# Patient Record
Sex: MALE | Race: WHITE | NOT HISPANIC OR LATINO | Employment: FULL TIME | URBAN - METROPOLITAN AREA
[De-identification: names, ages, dates, MRNs, and addresses within clinical notes are randomized per-mention and may not be internally consistent; named-entity substitution may affect disease eponyms.]

---

## 2018-06-05 ENCOUNTER — OFFICE VISIT (OUTPATIENT)
Dept: FAMILY MEDICINE | Facility: CLINIC | Age: 43
End: 2018-06-05
Payer: COMMERCIAL

## 2018-06-05 VITALS
HEIGHT: 73 IN | OXYGEN SATURATION: 96 % | HEART RATE: 63 BPM | SYSTOLIC BLOOD PRESSURE: 120 MMHG | BODY MASS INDEX: 27.23 KG/M2 | TEMPERATURE: 97.3 F | DIASTOLIC BLOOD PRESSURE: 82 MMHG | WEIGHT: 205.5 LBS

## 2018-06-05 DIAGNOSIS — K64.4 EXTERNAL HEMORRHOIDS: ICD-10-CM

## 2018-06-05 DIAGNOSIS — Z91.018 NUT ALLERGY: ICD-10-CM

## 2018-06-05 DIAGNOSIS — Z83.3 FAMILY HISTORY OF DIABETES MELLITUS: ICD-10-CM

## 2018-06-05 DIAGNOSIS — M54.2 CHRONIC NECK PAIN: ICD-10-CM

## 2018-06-05 DIAGNOSIS — E78.5 HYPERLIPIDEMIA, UNSPECIFIED HYPERLIPIDEMIA TYPE: ICD-10-CM

## 2018-06-05 DIAGNOSIS — H61.91 LESION OF RIGHT EXTERNAL EAR: ICD-10-CM

## 2018-06-05 DIAGNOSIS — G89.29 CHRONIC NECK PAIN: ICD-10-CM

## 2018-06-05 DIAGNOSIS — Z00.00 ROUTINE HISTORY AND PHYSICAL EXAMINATION OF ADULT: Primary | ICD-10-CM

## 2018-06-05 LAB — HBA1C MFR BLD: 5.4 % (ref 0–5.6)

## 2018-06-05 PROCEDURE — 83036 HEMOGLOBIN GLYCOSYLATED A1C: CPT | Performed by: INTERNAL MEDICINE

## 2018-06-05 PROCEDURE — 80061 LIPID PANEL: CPT | Performed by: INTERNAL MEDICINE

## 2018-06-05 PROCEDURE — 36415 COLL VENOUS BLD VENIPUNCTURE: CPT | Performed by: INTERNAL MEDICINE

## 2018-06-05 PROCEDURE — 99396 PREV VISIT EST AGE 40-64: CPT | Performed by: INTERNAL MEDICINE

## 2018-06-05 PROCEDURE — 83721 ASSAY OF BLOOD LIPOPROTEIN: CPT | Mod: 59 | Performed by: INTERNAL MEDICINE

## 2018-06-05 RX ORDER — CETIRIZINE HYDROCHLORIDE 10 MG/1
10 TABLET ORAL DAILY
COMMUNITY

## 2018-06-05 RX ORDER — EPINEPHRINE 0.3 MG/.3ML
0.3 INJECTION SUBCUTANEOUS PRN
Qty: 0.6 ML | Refills: 1 | Status: SHIPPED | OUTPATIENT
Start: 2018-06-05

## 2018-06-05 RX ORDER — FLUTICASONE PROPIONATE 50 MCG
1 SPRAY, SUSPENSION (ML) NASAL DAILY
COMMUNITY

## 2018-06-05 ASSESSMENT — ENCOUNTER SYMPTOMS
DIZZINESS: 0
CONSTIPATION: 0
NAUSEA: 0
SORE THROAT: 0
VOMITING: 0
BACK PAIN: 0
PALPITATIONS: 0
FATIGUE: 0
LIGHT-HEADEDNESS: 0
NECK PAIN: 1
COUGH: 0
FEVER: 0
ARTHRALGIAS: 0
SLEEP DISTURBANCE: 0
NUMBNESS: 0
DIFFICULTY URINATING: 0
HEADACHES: 1
MYALGIAS: 0
ABDOMINAL PAIN: 0
SHORTNESS OF BREATH: 0
TROUBLE SWALLOWING: 0
CHILLS: 0
DYSPHORIC MOOD: 0
EYE PAIN: 0
NERVOUS/ANXIOUS: 0
DIARRHEA: 0
BLOOD IN STOOL: 1

## 2018-06-05 NOTE — PROGRESS NOTES
"    SUBJECTIVE:   CC: Florentin Wetzel is an 42 year old male who presents for preventative health visit.       Patient has been experiencing chronic recurrent posterior neck pain, typically worse at the end of the day. He was reportedly told by some of his medical provider friends that it could be from dehydration.  No radiation of the pain to his upper extremities. Occasionally develops occipital headache because of the posterior neck pain.    He has food allergy to nuts (he states that he develops \"swelling of his airway\").  Needs a prescription for EpiPen.    He is also concerned about occasional blood on the toilet paper when he wipes himself after a bowel movement. Denies anorectal pain. Does not strain during bowel movements and is not prone to constipation.      Healthy Habits:    Do you get at least three servings of calcium containing foods daily (dairy, green leafy vegetables, etc.)? yes and 2 servings    Amount of exercise or daily activities, outside of work: walks with kids    Problems taking medications regularly No    Medication side effects: No    Have you had an eye exam in the past two years? yes    Do you see a dentist twice per year? once    Do you have sleep apnea, excessive snoring or daytime drowsiness?no       Today's PHQ-2 Score:   PHQ-2 ( 1999 Pfizer) 6/5/2018   Q1: Little interest or pleasure in doing things 0   Q2: Feeling down, depressed or hopeless 0   PHQ-2 Score 0       Abuse: Current or Past(Physical, Sexual or Emotional)- No  Do you feel safe in your environment - Yes     If you drink alcohol do you typically have >3 drinks per day or >7 drinks per week? No                      Reviewed orders with patient. Reviewed health maintenance and updated orders accordingly - Yes      Reviewed and updated as needed this visit by clinical staff  Tobacco  Allergies  Meds  Problems  Soc Hx      Reviewed and updated as needed this visit by Provider  Allergies  Meds  Problems        Past " Medical History:   Diagnosis Date     Contact dermatitis and other eczema, due to unspecified cause      Pure hypercholesterolemia        Past Surgical History:   Procedure Laterality Date     bone marrow harvest for donation         Family History   Problem Relation Age of Onset     Hypertension Mother      Lipids Mother      Other Cancer Mother      skin CA     DIABETES Father      type 2     Hypertension Father      Lipids Father      Arthritis Maternal Grandmother      Alcohol/Drug Maternal Grandfather      Alcohol/Drug Paternal Grandfather      Allergies Brother      Coronary Artery Disease Other      uncertain     C.A.D. No family hx of      Cancer - colorectal No family hx of      Colon Cancer No family hx of      Prostate Cancer No family hx of      CEREBROVASCULAR DISEASE No family hx of        Social History   Substance Use Topics     Smoking status: Never Smoker     Smokeless tobacco: Never Used      Comment: socially     Alcohol use Yes      Comment: 2 drinks per day       ROS:  Review of Systems   Constitutional: Negative for chills, fatigue and fever.   HENT: Negative for congestion, ear pain, hearing loss, sore throat and trouble swallowing.    Eyes: Negative for pain and visual disturbance.   Respiratory: Negative for cough and shortness of breath.    Cardiovascular: Negative for chest pain and palpitations.   Gastrointestinal: Positive for blood in stool. Negative for abdominal pain, constipation, diarrhea, nausea and vomiting.   Genitourinary: Negative for difficulty urinating and testicular pain.   Musculoskeletal: Positive for neck pain (see HPI). Negative for arthralgias, back pain and myalgias.   Skin: Negative for rash.   Neurological: Positive for headaches (see HPI). Negative for dizziness, light-headedness and numbness.   Psychiatric/Behavioral: Negative for dysphoric mood and sleep disturbance. The patient is not nervous/anxious.        OBJECTIVE:   /82 (BP Location: Left arm, Cuff  "Size: Adult Large)  Pulse 63  Temp 97.3  F (36.3  C) (Oral)  Ht 6' 0.5\" (1.842 m)  Wt 205 lb 8 oz (93.2 kg)  SpO2 96%  BMI 27.49 kg/m2  EXAM:  Physical Exam   Constitutional: He is oriented to person, place, and time. No distress.   HENT:   Right Ear: External ear normal.   Left Ear: External ear normal.   Mouth/Throat: Oropharynx is clear and moist. No oropharyngeal exudate.   Eyes: Conjunctivae are normal. Pupils are equal, round, and reactive to light.   Neck: Normal range of motion. Neck supple. No thyromegaly present.   Cardiovascular: Normal rate, regular rhythm and normal heart sounds.    Pulmonary/Chest: Effort normal and breath sounds normal. No respiratory distress.   Abdominal: Soft. There is no tenderness.   Genitourinary:   Genitourinary Comments: (+) external hemorrhoid   Musculoskeletal: Normal range of motion. He exhibits no edema or tenderness.   Lymphadenopathy:     He has no cervical adenopathy.   Neurological: He is alert and oriented to person, place, and time. Coordination normal.   Skin:   (+) hyperpigmented macular lesion at right pinna   Psychiatric: He has a normal mood and affect.   Vitals reviewed.      ASSESSMENT/PLAN:       ICD-10-CM    1. Routine history and physical examination of adult Z00.00    2. Hyperlipidemia, unspecified hyperlipidemia type E78.5 Lipid panel reflex to direct LDL Fasting   3. Chronic neck pain M54.2 [facility] PT, HAND, AND CHIROPRACTIC REFERRAL    G89.29    4. Nut allergy Z91.018 EPINEPHrine (EPIPEN 2-ALEXX) 0.3 MG/0.3ML injection 2-pack   5. Lesion of right external ear H61.91 DERMATOLOGY REFERRAL   6. External hemorrhoids K64.4  not too problematic at this time    7. Family history of diabetes mellitus Z83.3 Hemoglobin A1c       Patient Instructions   Proceed with Physical Therapy. Call doctor if your neck pain persist/worsens, or if you develop new symptoms.    Follow-up with Dermatologist to check the skin lesion at your right ear.    Always have an EpiPen " "readily available for use in the event that you develop an allergic reaction.    Maintain low fat/calorie diet and regular exercise.    Follow up yearly or as needed.        Preventive Health Recommendations  Male Ages 40 to 49    Yearly exam:             See your health care provider every year in order to  o   Review health changes.   o   Discuss preventive care.    o   Review your medicines if your doctor has prescribed any.    You should be tested each year for STDs (sexually transmitted diseases) if you re at risk.     Have a cholesterol test every 5 years.     Have a colonoscopy (test for colon cancer) if someone in your family has had colon cancer or polyps before age 50.     After age 45, have a diabetes test (fasting glucose). If you are at risk for diabetes, you should have this test every 3 years.      Talk with your health care provider about whether or not a prostate cancer screening test (PSA) is right for you.    Shots: Get a flu shot each year. Get a tetanus shot every 10 years.     Nutrition:    Eat at least 5 servings of fruits and vegetables daily.     Eat whole-grain bread, whole-wheat pasta and brown rice instead of white grains and rice.     Talk to your provider about Calcium and Vitamin D.     Lifestyle    Exercise for at least 150 minutes a week (30 minutes a day, 5 days a week). This will help you control your weight and prevent disease.     Limit alcohol to one drink per day.     No smoking.     Wear sunscreen to prevent skin cancer.     See your dentist every six months for an exam and cleaning.        COUNSELING:  Reviewed preventive health counseling, as reflected in patient instructions     reports that he has never smoked. He has never used smokeless tobacco.    Estimated body mass index is 27.49 kg/(m^2) as calculated from the following:    Height as of this encounter: 6' 0.5\" (1.842 m).    Weight as of this encounter: 205 lb 8 oz (93.2 kg).   Weight management plan: Discussed " healthy diet and exercise guidelines and patient will follow up in 12 months in clinic to re-evaluate.    Counseling Resources:  ATP IV Guidelines  Pooled Cohorts Equation Calculator  FRAX Risk Assessment  ICSI Preventive Guidelines  Dietary Guidelines for Americans, 2010  USDA's MyPlate  ASA Prophylaxis  Lung CA Screening    Bong Lowry MD  [facility]

## 2018-06-05 NOTE — PATIENT INSTRUCTIONS
Proceed with Physical Therapy. Call doctor if your neck pain persist/worsens, or if you develop new symptoms.    Follow-up with Dermatologist to check the skin lesion at your right ear.    Always have an EpiPen readily available for use in the event that you develop an allergic reaction.    Maintain low fat/calorie diet and regular exercise.    Follow up yearly or as needed.        Preventive Health Recommendations  Male Ages 40 to 49    Yearly exam:             See your health care provider every year in order to  o   Review health changes.   o   Discuss preventive care.    o   Review your medicines if your doctor has prescribed any.    You should be tested each year for STDs (sexually transmitted diseases) if you re at risk.     Have a cholesterol test every 5 years.     Have a colonoscopy (test for colon cancer) if someone in your family has had colon cancer or polyps before age 50.     After age 45, have a diabetes test (fasting glucose). If you are at risk for diabetes, you should have this test every 3 years.      Talk with your health care provider about whether or not a prostate cancer screening test (PSA) is right for you.    Shots: Get a flu shot each year. Get a tetanus shot every 10 years.     Nutrition:    Eat at least 5 servings of fruits and vegetables daily.     Eat whole-grain bread, whole-wheat pasta and brown rice instead of white grains and rice.     Talk to your provider about Calcium and Vitamin D.     Lifestyle    Exercise for at least 150 minutes a week (30 minutes a day, 5 days a week). This will help you control your weight and prevent disease.     Limit alcohol to one drink per day.     No smoking.     Wear sunscreen to prevent skin cancer.     See your dentist every six months for an exam and cleaning.

## 2018-06-05 NOTE — MR AVS SNAPSHOT
After Visit Summary   6/5/2018    Florentin Wetzel    MRN: [id number]           Patient Information     Date Of Birth          1975        Visit Information        Provider Department      6/5/2018 3:30 PM Bong Lowry MD [facility]        Today's Diagnoses     Routine history and physical examination of adult    -  1    HYPERCHOLESTEROLEMIA        Chronic neck pain        Nut allergy        Lesion of right external ear        Family history of diabetes mellitus        Need for prophylactic vaccination with tetanus-diphtheria (TD)          Care Instructions    Proceed with Physical Therapy. Call doctor if your neck pain persist/worsens, or if you develop new symptoms.    Follow-up with Dermatologist to check the skin lesion at your right ear.    Always have an EpiPen readily available for use in the event that you develop an allergic reaction.    Maintain low fat/calorie diet and regular exercise.    Follow up yearly or as needed.        Preventive Health Recommendations  Male Ages 40 to 49    Yearly exam:             See your health care provider every year in order to  o   Review health changes.   o   Discuss preventive care.    o   Review your medicines if your doctor has prescribed any.    You should be tested each year for STDs (sexually transmitted diseases) if you re at risk.     Have a cholesterol test every 5 years.     Have a colonoscopy (test for colon cancer) if someone in your family has had colon cancer or polyps before age 50.     After age 45, have a diabetes test (fasting glucose). If you are at risk for diabetes, you should have this test every 3 years.      Talk with your health care provider about whether or not a prostate cancer screening test (PSA) is right for you.    Shots: Get a flu shot each year. Get a tetanus shot every 10 years.     Nutrition:    Eat at least 5 servings of fruits and vegetables daily.     Eat whole-grain bread,  whole-wheat pasta and brown rice instead of white grains and rice.     Talk to your provider about Calcium and Vitamin D.     Lifestyle    Exercise for at least 150 minutes a week (30 minutes a day, 5 days a week). This will help you control your weight and prevent disease.     Limit alcohol to one drink per day.     No smoking.     Wear sunscreen to prevent skin cancer.     See your dentist every six months for an exam and cleaning.              Follow-ups after your visit        Additional Services     DERMATOLOGY REFERRAL       Your provider has referred you to: [facility]: [facility] [phone]   [email or web address]    Please be aware that coverage of these services is subject to the terms and limitations of your health insurance plan.  Call member services at your health plan with any benefit or coverage questions.      Please bring the following with you to your appointment:    (1) Any X-Rays, CTs or MRIs which have been performed.  Contact the facility where they were done to arrange for  prior to your scheduled appointment.    (2) List of current medications  (3) This referral request   (4) Any documents/labs given to you for this referral            [facility] PT, HAND, AND CHIROPRACTIC REFERRAL       **This order will print in the [facility] Scheduling Office**    Physical Therapy, Hand Therapy and Chiropractic Care are available through:    *[facility]  *[facility]  *[facility]    Call one number to schedule at any of the above locations: [phone].    Your provider has referred you to: Physical Therapy at [facility] or [facility]    Indication/Reason for Referral: Neck Pain  Onset of Illness: 5 years  Therapy Orders: Evaluate and Treat  Special Programs: as recommended by therapist    Special Request:     Jada Elias      Additional Comments for the Therapist or Chiropractor:     Please be aware that coverage of these services is  "subject to the terms and limitations of your health insurance plan.  Call member services at your health plan with any benefit or coverage questions.      Please bring the following to your appointment:    *Your personal calendar for scheduling future appointments  *Comfortable clothing                  Who to contact     If you have questions or need follow up information about today's clinic visit or your schedule please contact [facility] directly at [phone].  Normal or non-critical lab and imaging results will be communicated to you by MyChart, letter or phone within 4 business days after the clinic has received the results. If you do not hear from us within 7 days, please contact the clinic through [employer] or phone. If you have a critical or abnormal lab result, we will notify you by phone as soon as possible.  Submit refill requests through [employer] or call your pharmacy and they will forward the refill request to us. Please allow 3 business days for your refill to be completed.          Additional Information About Your Visit        [employer] Information     [employer] lets you send messages to your doctor, view your test results, renew your prescriptions, schedule appointments and more. To sign up, go to [email or web address] . Click on \"Log in\" on the left side of the screen, which will take you to the Welcome page. Then click on \"Sign up Now\" on the right side of the page.     You will be asked to enter the access code listed below, as well as some personal information. Please follow the directions to create your username and password.     Your access code is: [email or web address]  Expires: 9/3/2018  3:47 PM     Your access code will  in 90 days. If you need help or a new code, please call your [place] clinic or [phone].        Care EveryWhere ID     This is your Care EveryWhere ID. This could be used by other organizations to access your [place] medical records  [id number]        Your " "Vitals Were     Pulse Temperature Height Pulse Oximetry BMI (Body Mass Index)       63 97.3  F (36.3  C) (Oral) 6' 0.5\" (1.842 m) 96% 27.49 kg/m2        Blood Pressure from Last 3 Encounters:   06/05/18 120/82   01/13/09 110/84   12/30/08 120/70    Weight from Last 3 Encounters:   06/05/18 205 lb 8 oz (93.2 kg)   01/13/09 200 lb 3.2 oz (90.8 kg)   12/30/08 200 lb 6.4 oz (90.9 kg)              We Performed the Following     DERMATOLOGY REFERRAL     Hemoglobin A1c     [facility] PT, HAND, AND CHIROPRACTIC REFERRAL     Lipid panel reflex to direct LDL Fasting     MYCHART ACCESS CODE - Add PCP and Click on cosign on right          Today's Medication Changes          These changes are accurate as of 6/5/18  4:22 PM.  If you have any questions, ask your nurse or doctor.               These medicines have changed or have updated prescriptions.        Dose/Directions    * EPINEPHrine 0.3 MG/0.3ML injection   Commonly known as:  EPIPEN   This may have changed:  Another medication with the same name was added. Make sure you understand how and when to take each.   Used for:  Nut allergy   Changed by:  Bong Lowry MD        Dose:  0.3 mg   Inject 0.3 mLs into the muscle once as needed for anaphylaxis for 1 dose.   Quantity:  2 each   Refills:  3       * EPINEPHrine 0.3 MG/0.3ML injection 2-pack   Commonly known as:  EPIPEN 2-ALEXX   This may have changed:  You were already taking a medication with the same name, and this prescription was added. Make sure you understand how and when to take each.   Used for:  Nut allergy   Changed by:  Bong Lowry MD        Dose:  0.3 mg   Inject 0.3 mLs (0.3 mg) into the muscle as needed for anaphylaxis   Quantity:  0.6 mL   Refills:  1       * Notice:  This list has 2 medication(s) that are the same as other medications prescribed for you. Read the directions carefully, and ask your doctor or other care provider to review them with you.         Where to get " your medicines      Call your pharmacy to confirm that your medication is ready for pickup. It may take up to 24 hours for them to receive the prescription. If the prescription is not ready within 3 business days, please contact your clinic or your provider.     We will let you know when these medications are ready. If you don't hear back within 3 business days, please contact us.     EPINEPHrine 0.3 MG/0.3ML injection 2-pack                Primary Care Provider Office Phone # Fax #    Bong Eliud Lowry [phone] [address]  [facility] [address]        Equal Access to Services     [facility]: Hadii aad ku hadasho Soomaali, waaxda luqadaha, qaybta kaalmada adeluzyada, willa rodrigues . So [facility] [phone].    ATENCIÓN: Si habla español, tiene a torres disposición servicios gratuitos de asistencia lingüística. [facility] [phone].    We comply with applicable federal civil rights laws and [place] laws. We do not discriminate on the basis of race, color, national origin, age, disability, sex, sexual orientation, or gender identity.            Thank you!     Thank you for choosing [facility]  for your care. Our goal is always to provide you with excellent care. Hearing back from our patients is one way we can continue to improve our services. Please take a few minutes to complete the written survey that you may receive in the mail after your visit with us. Thank you!             Your Updated Medication List - Protect others around you: Learn how to safely use, store and throw away your medicines at www.disposemymeds.org.          This list is accurate as of 6/5/18  4:22 PM.  Always use your most recent med list.                   Brand Name Dispense Instructions for use Diagnosis    cetirizine 10 MG tablet    zyrTEC     Take 10 mg by mouth daily        * EPINEPHrine 0.3 MG/0.3ML injection    EPIPEN    2 each    Inject 0.3 mLs into  the muscle once as needed for anaphylaxis for 1 dose.    Nut allergy       * EPINEPHrine 0.3 MG/0.3ML injection 2-pack    EPIPEN 2-ALEXX    0.6 mL    Inject 0.3 mLs (0.3 mg) into the muscle as needed for anaphylaxis    Nut allergy       fluticasone 50 MCG/ACT spray    FLONASE     Spray 1 spray into both nostrils daily        * Notice:  This list has 2 medication(s) that are the same as other medications prescribed for you. Read the directions carefully, and ask your doctor or other care provider to review them with you.

## 2018-06-06 LAB
CHOLEST SERPL-MCNC: 279 MG/DL
HDLC SERPL-MCNC: 30 MG/DL
LDLC SERPL CALC-MCNC: ABNORMAL MG/DL
LDLC SERPL DIRECT ASSAY-MCNC: 172 MG/DL
NONHDLC SERPL-MCNC: 249 MG/DL
TRIGL SERPL-MCNC: 425 MG/DL

## 2018-06-13 ENCOUNTER — VIRTUAL VISIT (OUTPATIENT)
Dept: FAMILY MEDICINE | Facility: CLINIC | Age: 43
End: 2018-06-13
Payer: COMMERCIAL

## 2018-06-13 DIAGNOSIS — E78.5 HYPERLIPIDEMIA, UNSPECIFIED HYPERLIPIDEMIA TYPE: Primary | ICD-10-CM

## 2018-06-13 PROCEDURE — 99207 ZZC NO BILLABLE SERVICE THIS VISIT: CPT | Performed by: INTERNAL MEDICINE

## 2018-06-13 RX ORDER — ATORVASTATIN CALCIUM 40 MG/1
40 TABLET, FILM COATED ORAL DAILY
Qty: 30 TABLET | Refills: 3 | Status: SHIPPED | OUTPATIENT
Start: 2018-06-13 | End: 2018-09-26

## 2018-06-13 NOTE — MR AVS SNAPSHOT
"              After Visit Summary   2018    Florentin Wetzel    MRN: [id number]           Patient Information     Date Of Birth          1975        Visit Information        Provider Department      2018 4:45 PM Bong Lowry MD [facility]        Today's Diagnoses     Hyperlipidemia, unspecified hyperlipidemia type    -  1       Follow-ups after your visit        Who to contact     If you have questions or need follow up information about today's clinic visit or your schedule please contact [facility] directly at [phone].  Normal or non-critical lab and imaging results will be communicated to you by [employer], letter or phone within 4 business days after the clinic has received the results. If you do not hear from us within 7 days, please contact the clinic through [employer] or phone. If you have a critical or abnormal lab result, we will notify you by phone as soon as possible.  Submit refill requests through [employer] or call your pharmacy and they will forward the refill request to us. Please allow 3 business days for your refill to be completed.          Additional Information About Your Visit        MyChart Information     [employer] [facility] lets you send messages to your doctor, view your test results, renew your prescriptions, schedule appointments and more. To sign up, go to [email or web address] . Click on \"Log in\" on the left side of the screen, which will take you to the Welcome page. Then click on \"Sign up Now\" on the right side of the page.     You will be asked to enter the access code listed below, as well as some personal information. Please follow the directions to create your username and password.     Your access code is: [email or web address]  Expires: 9/3/2018  3:47 PM     Your access code will  in 90 days. If you need help or a new code, please call your [facility] or [phone].        Care EveryWhere ID     This is your Care EveryWhere ID. " This could be used by other organizations to access your [facility] medical records  [id number]         Blood Pressure from Last 3 Encounters:   06/05/18 120/82   01/13/09 110/84   12/30/08 120/70    Weight from Last 3 Encounters:   06/05/18 205 lb 8 oz (93.2 kg)   01/13/09 200 lb 3.2 oz (90.8 kg)   12/30/08 200 lb 6.4 oz (90.9 kg)              Today, you had the following     No orders found for display         Today's Medication Changes          These changes are accurate as of 6/13/18  6:39 PM.  If you have any questions, ask your nurse or doctor.               Start taking these medicines.        Dose/Directions    atorvastatin 40 MG tablet   Commonly known as:  LIPITOR   Used for:  Hyperlipidemia, unspecified hyperlipidemia type   Started by:  Bong Lowry MD        Dose:  40 mg   Take 1 tablet (40 mg) by mouth daily Schedule a laboratory-only visit in 3 months to recheck your cholesterol (come in fasting).   Quantity:  30 tablet   Refills:  3            Where to get your medicines      These medications were sent to [facility] [address] [place] [address] [address] [facility] [place] [address], [facility] [address]     Phone:  [phone]     atorvastatin 40 MG tablet                Primary Care Provider Office Phone # Fax #    Bong Lowry [phone] [address] [address]        Equal Access to Services     GRETEL MARIANO AH: Hadii chay scherero Sodemetra, waaxda luqadaha, qaybta kaalmada adeegyada, willa mooney. So [facility] [phone].    ATENCIÓN: Si habla español, tiene a torres disposición servicios gratuitos de asistencia lingüística. Llame al [phone].    We comply with applicable federal civil rights laws and [place] laws. We do not discriminate on the basis of race, color, national origin, age, disability, sex, sexual orientation, or gender identity.             Thank you!     Thank you for choosing [facility]  for your care. Our goal is always to provide you with excellent care. Hearing back from our patients is one way we can continue to improve our services. Please take a few minutes to complete the written survey that you may receive in the mail after your visit with us. Thank you!             Your Updated Medication List - Protect others around you: Learn how to safely use, store and throw away your medicines at www.disposemymeds.org.          This list is accurate as of 6/13/18  6:39 PM.  Always use your most recent med list.                   Brand Name Dispense Instructions for use Diagnosis    atorvastatin 40 MG tablet    LIPITOR    30 tablet    Take 1 tablet (40 mg) by mouth daily Schedule a laboratory-only visit in 3 months to recheck your cholesterol (come in fasting).    Hyperlipidemia, unspecified hyperlipidemia type       cetirizine 10 MG tablet    zyrTEC     Take 10 mg by mouth daily        * EPINEPHrine 0.3 MG/0.3ML injection    EPIPEN    2 each    Inject 0.3 mLs into the muscle once as needed for anaphylaxis for 1 dose.    Nut allergy       * EPINEPHrine 0.3 MG/0.3ML injection 2-pack    EPIPEN 2-ALEXX    0.6 mL    Inject 0.3 mLs (0.3 mg) into the muscle as needed for anaphylaxis    Nut allergy       fluticasone 50 MCG/ACT spray    FLONASE     Spray 1 spray into both nostrils daily        * Notice:  This list has 2 medication(s) that are the same as other medications prescribed for you. Read the directions carefully, and ask your doctor or other care provider to review them with you.

## 2018-06-13 NOTE — PROGRESS NOTES
"Florentin Wetzel is a 42 year old male who is being evaluated via a telephone visit.      The patient has been notified of following:     \"This telephone visit will be conducted via a call between you and your physician/provider. We have found that certain health care needs can be provided without the need for a physical exam.  This service lets us provide the care you need with a short phone conversation.  If a prescription is necessary we can send it directly to your pharmacy.  If lab work is needed we can place an order for that and you can then stop by our lab to have the test done at a later time.    We will bill your insurance company for this service.  Please check with your medical insurance if this type of visit is covered. You may be responsible for the cost of this type of visit if insurance coverage is denied.  The typical cost is $30 (10min), $59 (11-20min) and $85 (21-30min).  Most often these visits are shorter than 10 minutes.    If during the course of the call the physician/provider feels a telephone visit is not appropriate, you will not be charged for this service.\"       Consent has been obtained for this service by care team member: yes.   See the scanned image in the medical record.    Florentin Wetzel complains of  Results.    I have reviewed and updated the patient's Past Medical History, Social History, Family History and Medication List.    ALLERGIES  Nuts; Cats; Dogs; No known drug allergies; and Trees    Bong Lowry MD on 6/13/2018 at 3:17 PM      Physician's Notes:    I talked to the patient about his test results and focused on his hyperlipidemia. I talked to them about his 10-year cardiac risk score of 9% and how it is above the accepted threshold, which places him at significant risk for developing heart disease. Discussed about medical treatment, including how statins work and what are the common side effects. Also discussed about aggressive lifestyle modifications " such as a strict low-fat diet/calorie and regular exercise with the intention to lose 20-30 pounds. Prescription has been sent to the patient's pharmacy and he said that he will think more about proceeding with medical treatment or not. I advised the patient that regardless of which option he takes, we will need to recheck his fasting lipids in 3 months.      Assessment/Plan:      ICD-10-CM    1. Hyperlipidemia, unspecified hyperlipidemia type E78.5  atorvastatin 40 mg daily        Total time of call between patient and provider was 9 minutes

## 2018-07-02 ENCOUNTER — THERAPY VISIT (OUTPATIENT)
Dept: PHYSICAL THERAPY | Facility: CLINIC | Age: 43
End: 2018-07-02
Payer: COMMERCIAL

## 2018-07-02 DIAGNOSIS — M54.2 CERVICALGIA: ICD-10-CM

## 2018-07-02 PROCEDURE — 97161 PT EVAL LOW COMPLEX 20 MIN: CPT | Mod: GP | Performed by: PHYSICAL THERAPIST

## 2018-07-02 PROCEDURE — 97110 THERAPEUTIC EXERCISES: CPT | Mod: GP | Performed by: PHYSICAL THERAPIST

## 2018-07-02 NOTE — PROGRESS NOTES
[facility] Initial Evaluation  Subjective:  Florentin Wetzel is a 43 year old male.    Patient s chief complaints: posterior neck pain, suboccipital area, intermittent headache.    Condition occurred due to relates to posture posture.  Date of Onset: has noted neck pain for 5-6 years, relates to sitting on couch, playing video games, computer use.   Saw his MD 6/5/18 and was referred to PT.  Location of symptoms is posterior neck and suboccipital area .  Symptoms other than pain include: HA, stiffness (denies numbness/tingling, weakness)   Quality of pain is aching and frequency is intermittent.    Pain dependence on time of day is: not dependent on time of day (worse in morning if sleeps wrong).   Pain rating is: 6/10.    Symptoms are exacerbated by: reading, sitting--depending on chair while watching TV or playing video games.    Symptoms are relieved by:  Staying hydrated, used to take Advil--but backed off.    Progression of symptoms is that symptoms are:  unchanged.  Imaging/Special tests include: none   Previous treatments include: none.   Patient reports that general health is: good.   Pertinent medical history includes:  none.    Medical allergies includes: nuts.   Surgical history includes: none.  Current medications include: lipitor for cholesterol  Current occupation is: Maketing  Work status is: working normal job  Primary job tasks include: sitting/computer work  Barriers include: none  Red flags include: none    Patient's expectations for therapy include: make sure not making neck worse.     HPI                    Objective:  CERVICAL:    Posture: poor, slouched, forward head  Posture Correction: no change but no pain at baseline sitting    Neurological:    Motor Deficit:  Myotomes L R   C4 (shoulder elevation) 5 5   C5 (shoulder abduction) 5 5   C6 (elbow flexion) 5 5   C7 (elbow extension) 5 5   C8 (thumb extension) 5 5   T1 (finger add/abd) 5 5    Strength (lb) WNL WNL      Sensory Deficit, Reflexes, Dural Signs: intact light touch screen B UE dermatomes    AROM: (Major, Moderate, Minimal or Nil loss)  Movement Loss Jean Mod Min Nil Pain   Protrusion    X No effect   Flexion    X No effect   Retraction    X No effect   Extension   X  Feels compressed   Left Rotation   X X No effect   Right Rotation   X X No effect   Left Side Bending   X  No effect   Right Side bending   X  No effect     Repeated movement testing:   (During: produces, abolishes, increases, decreases, no effect, centralizing, peripheralizing; After: better, worse, no better, no worse, no effect, centralized, peripheralized)    Pre-test Symptoms Sitting: no symptoms   Symptoms During Symptoms After ROM increased ROM decreased No Effect   PRO        Rep PRO        RET        Rep RET Produced trace pain posterior neck during No worse X slight improvement in B rotation after     RET EXT        Rep RET EXT        LF - R        Rep LF - R        LF - L        Rep LF - L        ROT - R        Rep ROT - R        ROT - L        Rep ROT - L        FLEX        Rep FLEX          Provisional Classification: likely derangement, will initiate cervical retraction  Principle of Management: retraction in sitting x 10 reps every 2-3 hours; or if has HA, sustain for 1 min.  Work on posture, pec stretching, scapular stabilization.     System    Physical Exam    General     ROS    Assessment/Plan:    Patient is a 43 year old male with cervical complaints.    Patient has the following significant findings with corresponding treatment plan.                Diagnosis 1:  Neck pain/HA    Pain -  hot/cold therapy, manual therapy and directional preference exercise  Decreased ROM/flexibility - manual therapy and therapeutic exercise  Decreased strength - therapeutic exercise and therapeutic activities  Decreased proprioception - neuro re-education and therapeutic activities  Decreased function - therapeutic activities  Impaired posture - neuro  re-education    Therapy Evaluation Codes:   1) History comprised of:   Personal factors that impact the plan of care:      None.    Comorbidity factors that impact the plan of care are:      None.     Medications impacting care: None.  2) Examination of Body Systems comprised of:   Body structures and functions that impact the plan of care:      Cervical spine.   Activity limitations that impact the plan of care are:      Driving, Reading/Computer work and Sitting.  3) Clinical presentation characteristics are:   Stable/Uncomplicated.  4) Decision-Making    Low complexity using standardized patient assessment instrument and/or measureable assessment of functional outcome.  Cumulative Therapy Evaluation is: Low complexity.    Previous and current functional limitations:  (See Goal Flow Sheet for this information)    Short term and Long term goals: (See Goal Flow Sheet for this information)     Communication ability:  Patient appears to be able to clearly communicate and understand verbal and written communication and follow directions correctly.  Treatment Explanation - The following has been discussed with the patient:   RX ordered/plan of care  Anticipated outcomes  Possible risks and side effects  This patient would benefit from PT intervention to resume normal activities.   Rehab potential is good.    Frequency:  1 X week, once daily  Duration:  for 6 weeks  Discharge Plan:  Achieve all LTG.  Independent in home treatment program.  Reach maximal therapeutic benefit.    Please refer to the daily flowsheet for treatment today, total treatment time and time spent performing 1:1 timed codes.

## 2018-09-25 ENCOUNTER — OFFICE VISIT (OUTPATIENT)
Dept: FAMILY MEDICINE | Facility: CLINIC | Age: 43
End: 2018-09-25
Payer: COMMERCIAL

## 2018-09-25 ENCOUNTER — DOCUMENTATION ONLY (OUTPATIENT)
Dept: LAB | Facility: CLINIC | Age: 43
End: 2018-09-25

## 2018-09-25 VITALS
BODY MASS INDEX: 27.69 KG/M2 | HEIGHT: 73 IN | OXYGEN SATURATION: 96 % | HEART RATE: 55 BPM | TEMPERATURE: 98.7 F | SYSTOLIC BLOOD PRESSURE: 116 MMHG | WEIGHT: 208.9 LBS | DIASTOLIC BLOOD PRESSURE: 77 MMHG

## 2018-09-25 DIAGNOSIS — Z86.2 HISTORY OF ANEMIA: ICD-10-CM

## 2018-09-25 DIAGNOSIS — Z23 NEED FOR PROPHYLACTIC VACCINATION AND INOCULATION AGAINST INFLUENZA: ICD-10-CM

## 2018-09-25 DIAGNOSIS — E78.5 HYPERLIPIDEMIA, UNSPECIFIED HYPERLIPIDEMIA TYPE: Primary | ICD-10-CM

## 2018-09-25 LAB
BASOPHILS # BLD AUTO: 0 10E9/L (ref 0–0.2)
BASOPHILS NFR BLD AUTO: 0.4 %
DIFFERENTIAL METHOD BLD: NORMAL
EOSINOPHIL # BLD AUTO: 0.1 10E9/L (ref 0–0.7)
EOSINOPHIL NFR BLD AUTO: 2.2 %
ERYTHROCYTE [DISTWIDTH] IN BLOOD BY AUTOMATED COUNT: 13.1 % (ref 10–15)
HCT VFR BLD AUTO: 41.7 % (ref 40–53)
HGB BLD-MCNC: 14.2 G/DL (ref 13.3–17.7)
LYMPHOCYTES # BLD AUTO: 1.6 10E9/L (ref 0.8–5.3)
LYMPHOCYTES NFR BLD AUTO: 32.5 %
MCH RBC QN AUTO: 31.3 PG (ref 26.5–33)
MCHC RBC AUTO-ENTMCNC: 34.1 G/DL (ref 31.5–36.5)
MCV RBC AUTO: 92 FL (ref 78–100)
MONOCYTES # BLD AUTO: 0.4 10E9/L (ref 0–1.3)
MONOCYTES NFR BLD AUTO: 7.3 %
NEUTROPHILS # BLD AUTO: 2.9 10E9/L (ref 1.6–8.3)
NEUTROPHILS NFR BLD AUTO: 57.6 %
PLATELET # BLD AUTO: 297 10E9/L (ref 150–450)
RBC # BLD AUTO: 4.54 10E12/L (ref 4.4–5.9)
WBC # BLD AUTO: 5.1 10E9/L (ref 4–11)

## 2018-09-25 PROCEDURE — 36415 COLL VENOUS BLD VENIPUNCTURE: CPT | Performed by: INTERNAL MEDICINE

## 2018-09-25 PROCEDURE — 90686 IIV4 VACC NO PRSV 0.5 ML IM: CPT | Performed by: INTERNAL MEDICINE

## 2018-09-25 PROCEDURE — 90471 IMMUNIZATION ADMIN: CPT | Performed by: INTERNAL MEDICINE

## 2018-09-25 PROCEDURE — 99214 OFFICE O/P EST MOD 30 MIN: CPT | Mod: 25 | Performed by: INTERNAL MEDICINE

## 2018-09-25 PROCEDURE — 80061 LIPID PANEL: CPT | Performed by: INTERNAL MEDICINE

## 2018-09-25 PROCEDURE — 85025 COMPLETE CBC W/AUTO DIFF WBC: CPT | Performed by: INTERNAL MEDICINE

## 2018-09-25 ASSESSMENT — ENCOUNTER SYMPTOMS
COUGH: 0
SHORTNESS OF BREATH: 0
VOMITING: 0
CLAUDICATION: 0
SENSORY CHANGE: 0
ABDOMINAL PAIN: 0
PALPITATIONS: 0
FOCAL WEAKNESS: 0
DIZZINESS: 0
WEIGHT LOSS: 0
NAUSEA: 0
HEADACHES: 0

## 2018-09-25 NOTE — PROGRESS NOTES
Patient came into lab after seeing , but no orders were in.   Lab sandra a Green Gel and Purple tube.  Please place orders if needed.  Thank you lab,

## 2018-09-25 NOTE — PROGRESS NOTES
"HPI    SUBJECTIVE:   Florentin Wetzel is a 43 year old male who presents to clinic today for the following health issues:      Medication Followup of Atorvastatin    Taking Medication as prescribed: yes    Side Effects:  None    Medication Helping Symptoms:  not applicable       I last saw the patient at the clinic on 6/5/2018 for his yearly physical. Lipid profile showed significantly elevated cholesterol levels. Patient was advised to start atorvastatin 40 mg daily.    Since his last clinic visit, he appears to be tolerating the medication well. He comes in fasting today to recheck his lipid panel.    We will also check a CBC given his history of anemia. No overt bleeding episodes observed.    He feels well otherwise and didn't present any subjective complaints.      Past Medical History:   Diagnosis Date     Contact dermatitis and other eczema, due to unspecified cause      Pure hypercholesterolemia        Review of Systems   Constitutional: Negative for malaise/fatigue and weight loss.   Respiratory: Negative for cough and shortness of breath.    Cardiovascular: Negative for chest pain, palpitations, claudication and leg swelling.   Gastrointestinal: Negative for abdominal pain, blood in stool, melena, nausea and vomiting.   Genitourinary: Negative for hematuria.   Neurological: Negative for dizziness, sensory change, focal weakness and headaches.       /77 (BP Location: Right arm, Cuff Size: Adult Large)  Pulse 55  Temp 98.7  F (37.1  C) (Oral)  Ht 6' 0.5\" (1.842 m)  Wt 208 lb 14.4 oz (94.8 kg)  SpO2 96%  BMI 27.94 kg/m2      Physical Exam   Constitutional: He is oriented to person, place, and time. No distress.   Neck: No thyromegaly present.   Cardiovascular: Normal rate, regular rhythm and normal heart sounds.    Pulmonary/Chest: Effort normal and breath sounds normal. No respiratory distress.   Musculoskeletal: He exhibits no edema.   Neurological: He is alert and oriented to person, place, and " time. Coordination normal. GCS score is 15.   Nursing note and vitals reviewed.        ICD-10-CM    1. Hyperlipidemia, unspecified hyperlipidemia type E78.5 Lipid panel reflex to direct LDL Fasting   2. History of anemia Z86.2 CBC with platelets differential   3. Need for prophylactic vaccination and inoculation against influenza Z23 FLU VACCINE, SPLIT VIRUS, IM (QUADRIVALENT) [04933]- >3 YRS     Vaccine Administration, Initial [26762]

## 2018-09-25 NOTE — MR AVS SNAPSHOT
"              After Visit Summary   9/25/2018    Florentin Wetzel    MRN: [id number]           Patient Information     Date Of Birth          1975        Visit Information        Provider Department      9/25/2018 9:00 AM [facility]        Today's Diagnoses     Hyperlipidemia, unspecified hyperlipidemia type    -  1    History of anemia        Need for prophylactic vaccination and inoculation against influenza           Follow-ups after your visit        Who to contact     If you have questions or need follow up information about today's clinic visit or your schedule please contact [facility] directly at [phone].  Normal or non-critical lab and imaging results will be communicated to you by [employer], letter or phone within 4 business days after the clinic has received the results. If you do not hear from us within 7 days, please contact the clinic through [employer] or phone. If you have a critical or abnormal lab result, we will notify you by phone as soon as possible.  Submit refill requests through [employer] or call your pharmacy and they will forward the refill request to us. Please allow 3 business days for your refill to be completed.          Additional Information About Your Visit        MyChart Information     [employer] gives you secure access to your electronic health record. If you see a primary care provider, you can also send messages to your care team and make appointments. If you have questions, please call your primary care clinic.  If you do not have a primary care provider, please call [phone] and they will assist you.        Care EveryWhere ID     This is your Care EveryWhere ID. This could be used by other organizations to access your [facility] medical records  [id number]        Your Vitals Were     Pulse Temperature Height Pulse Oximetry BMI (Body Mass Index)       55 98.7  F (37.1  C) (Oral) 6' 0.5\" (1.842 m) 96% 27.94 kg/m2  "       Blood Pressure from Last 3 Encounters:   09/25/18 116/77   06/05/18 120/82   01/13/09 110/84    Weight from Last 3 Encounters:   09/25/18 208 lb 14.4 oz (94.8 kg)   06/05/18 205 lb 8 oz (93.2 kg)   01/13/09 200 lb 3.2 oz (90.8 kg)              We Performed the Following     CBC with platelets differential     FLU VACCINE, SPLIT VIRUS, IM (QUADRIVALENT) [85402]- >3 YRS     Lipid panel reflex to direct LDL Fasting     Vaccine Administration, Initial [19888]          Today's Medication Changes          These changes are accurate as of 9/25/18 11:59 PM.  If you have any questions, ask your nurse or doctor.               These medicines have changed or have updated prescriptions.        Dose/Directions    EPINEPHrine 0.3 MG/0.3ML injection 2-pack   Commonly known as:  EPIPEN 2-ALEXX   This may have changed:  Another medication with the same name was removed. Continue taking this medication, and follow the directions you see here.   Used for:  Nut allergy   Changed by:  Bong oLwry MD        Dose:  0.3 mg   Inject 0.3 mLs (0.3 mg) into the muscle as needed for anaphylaxis   Quantity:  0.6 mL   Refills:  1                Primary Care Provider Office Phone # Fax #    Bong Lowry [phone] [address] [address]        Equal Access to Services     JEISON MARIANO AH: Hadii aad ku hadasho Solouieali, waaxda luqadaha, qaybta kaalmada adeegyada, willa rodrigues . So [facility] [phone].    ATENCIÓN: Si habla español, tiene a torres disposición servicios gratuitos de asistencia lingüística. Llame al [phone].    We comply with applicable federal civil rights laws and [place] laws. We do not discriminate on the basis of race, color, national origin, age, disability, sex, sexual orientation, or gender identity.            Thank you!     Thank you for choosing [facility]  for your care. Our goal is always to  provide you with excellent care. Hearing back from our patients is one way we can continue to improve our services. Please take a few minutes to complete the written survey that you may receive in the mail after your visit with us. Thank you!             Your Updated Medication List - Protect others around you: Learn how to safely use, store and throw away your medicines at www.disposemymeds.org.          This list is accurate as of 9/25/18 11:59 PM.  Always use your most recent med list.                   Brand Name Dispense Instructions for use Diagnosis    atorvastatin 40 MG tablet    LIPITOR    30 tablet    Take 1 tablet (40 mg) by mouth daily Schedule a laboratory-only visit in 3 months to recheck your cholesterol (come in fasting).    Hyperlipidemia, unspecified hyperlipidemia type       cetirizine 10 MG tablet    zyrTEC     Take 10 mg by mouth daily        EPINEPHrine 0.3 MG/0.3ML injection 2-pack    EPIPEN 2-ALEXX    0.6 mL    Inject 0.3 mLs (0.3 mg) into the muscle as needed for anaphylaxis    Nut allergy       fluticasone 50 MCG/ACT spray    FLONASE     Spray 1 spray into both nostrils daily

## 2018-09-26 DIAGNOSIS — E78.5 HYPERLIPIDEMIA, UNSPECIFIED HYPERLIPIDEMIA TYPE: ICD-10-CM

## 2018-09-26 LAB
CHOLEST SERPL-MCNC: 147 MG/DL
HDLC SERPL-MCNC: 37 MG/DL
LDLC SERPL CALC-MCNC: 71 MG/DL
NONHDLC SERPL-MCNC: 110 MG/DL
TRIGL SERPL-MCNC: 194 MG/DL

## 2018-09-26 RX ORDER — ATORVASTATIN CALCIUM 40 MG/1
40 TABLET, FILM COATED ORAL DAILY
Qty: 90 TABLET | Refills: 1 | Status: SHIPPED | OUTPATIENT
Start: 2018-09-26 | End: 2019-05-29

## 2018-09-26 ASSESSMENT — ENCOUNTER SYMPTOMS
HEMATURIA: 0
BLOOD IN STOOL: 0

## 2018-11-07 ENCOUNTER — MYC MEDICAL ADVICE (OUTPATIENT)
Dept: FAMILY MEDICINE | Facility: CLINIC | Age: 43
End: 2018-11-07

## 2018-11-07 NOTE — TELEPHONE ENCOUNTER
Filomena (Referrals)-     Please see message below. Does this require a referral?     Thank you!    Crystal GODINEZ RN

## 2019-05-28 DIAGNOSIS — E78.5 HYPERLIPIDEMIA, UNSPECIFIED HYPERLIPIDEMIA TYPE: ICD-10-CM

## 2019-05-29 ENCOUNTER — MYC REFILL (OUTPATIENT)
Dept: FAMILY MEDICINE | Facility: CLINIC | Age: 44
End: 2019-05-29

## 2019-05-29 DIAGNOSIS — E78.5 HYPERLIPIDEMIA, UNSPECIFIED HYPERLIPIDEMIA TYPE: ICD-10-CM

## 2019-05-29 RX ORDER — ATORVASTATIN CALCIUM 40 MG/1
40 TABLET, FILM COATED ORAL DAILY
Qty: 90 TABLET | Refills: 1 | Status: CANCELLED | OUTPATIENT
Start: 2019-05-29

## 2019-05-29 RX ORDER — ATORVASTATIN CALCIUM 40 MG/1
40 TABLET, FILM COATED ORAL DAILY
Qty: 30 TABLET | Refills: 0 | Status: SHIPPED | OUTPATIENT
Start: 2019-05-29 | End: 2019-07-17

## 2019-05-29 NOTE — TELEPHONE ENCOUNTER
"Medication is being filled for 1 time refill only due to:  Patient needs to be seen because due for fasting physical June 2019.   Heydi LOPEZ RN    Last Written Prescription Date:  9/26/2018  Last Fill Quantity: 90,  # refills: 1   Last office visit: 9/25/2018 with prescribing provider:     Future Office Visit:    Requested Prescriptions   Pending Prescriptions Disp Refills     atorvastatin (LIPITOR) 40 MG tablet 90 tablet 1     Sig: Take 1 tablet (40 mg) by mouth daily Schedule a laboratory-only visit in 3 months to recheck your cholesterol (come in fasting).       Statins Protocol Passed - 5/28/2019  9:09 PM        Passed - LDL on file in past 12 months     Recent Labs   Lab Test 09/25/18  0930   LDL 71             Passed - No abnormal creatine kinase in past 12 months     No lab results found.             Passed - Recent (12 mo) or future (30 days) visit within the authorizing provider's specialty     Patient had office visit in the last 12 months or has a visit in the next 30 days with authorizing provider or within the authorizing provider's specialty.  See \"Patient Info\" tab in inbasket, or \"Choose Columns\" in Meds & Orders section of the refill encounter.              Passed - Medication is active on med list        Passed - Patient is age 18 or older          "

## 2019-07-15 DIAGNOSIS — E78.5 HYPERLIPIDEMIA, UNSPECIFIED HYPERLIPIDEMIA TYPE: ICD-10-CM

## 2019-07-17 RX ORDER — ATORVASTATIN CALCIUM 40 MG/1
40 TABLET, FILM COATED ORAL DAILY
Qty: 30 TABLET | Refills: 0 | Status: SHIPPED | OUTPATIENT
Start: 2019-07-17 | End: 2019-08-29

## 2019-07-17 NOTE — TELEPHONE ENCOUNTER
"Routing refill request to provider for review/approval because:  Rachelle given x1 and patient did not follow up, please advise  Patient needs to be seen because:  Patient advised at [facility] on 9/25/2018 to follow up 3/2019 for physical    Requested Prescriptions   Pending Prescriptions Disp Refills     atorvastatin (LIPITOR) 40 MG tablet 30 tablet 0     Sig: Take 1 tablet (40 mg) by mouth daily   Last Written Prescription Date:  5/29/2019  Last Fill Quantity: 30,  # refills: 0   Last office visit: 9/25/2018 with prescribing provider:  Essence  Future Office Visit:        Statins Protocol Passed - 7/15/2019  7:40 PM        Passed - LDL on file in past 12 months     Recent Labs   Lab Test 09/25/18  0930   LDL 71             Passed - No abnormal creatine kinase in past 12 months     No lab results found.             Passed - Recent (12 mo) or future (30 days) visit within the authorizing provider's specialty     Patient had office visit in the last 12 months or has a visit in the next 30 days with authorizing provider or within the authorizing provider's specialty.  See \"Patient Info\" tab in inbasket, or \"Choose Columns\" in Meds & Orders section of the refill encounter.              Passed - Medication is active on med list        Passed - Patient is age 18 or older            JOSESITO Stewart, RN  [employer] [facility]          "

## 2019-08-29 ENCOUNTER — MYC REFILL (OUTPATIENT)
Dept: FAMILY MEDICINE | Facility: CLINIC | Age: 44
End: 2019-08-29

## 2019-08-29 DIAGNOSIS — E78.5 HYPERLIPIDEMIA, UNSPECIFIED HYPERLIPIDEMIA TYPE: ICD-10-CM

## 2019-08-29 NOTE — TELEPHONE ENCOUNTER
"atorvastatin (LIPITOR) 40 MG tablet 30 tablet 0 7/17/2019     Last Written Prescription Date:  7/17/19  Last Fill Quantity: 30,  # refills: 0   Last office visit: 9/25/2018 with prescribing provider:  Essence   Future Office Visit:   Next 5 appointments (look out 90 days)    Sep 11, 2019 11:15 AM CDT  PHYSICAL with Bong Lowry MD  [facility] [address] [place] [phone]         Requested Prescriptions   Pending Prescriptions Disp Refills     atorvastatin (LIPITOR) 40 MG tablet 30 tablet 0     Sig: Take 1 tablet (40 mg) by mouth daily Please schedule follow-up with Dr. Lowry (please come in fasting)       Statins Protocol Passed - 8/29/2019  4:24 PM        Passed - LDL on file in past 12 months     Recent Labs   Lab Test 09/25/18  0930   LDL 71             Passed - No abnormal creatine kinase in past 12 months     No lab results found.             Passed - Recent (12 mo) or future (30 days) visit within the authorizing provider's specialty     Patient had office visit in the last 12 months or has a visit in the next 30 days with authorizing provider or within the authorizing provider's specialty.  See \"Patient Info\" tab in inbasket, or \"Choose Columns\" in Meds & Orders section of the refill encounter.              Passed - Medication is active on med list        Passed - Patient is age 18 or older        No flowsheet data found.    "

## 2019-08-30 RX ORDER — ATORVASTATIN CALCIUM 40 MG/1
40 TABLET, FILM COATED ORAL DAILY
Qty: 30 TABLET | Refills: 0 | Status: SHIPPED | OUTPATIENT
Start: 2019-08-30 | End: 2019-09-12 | Stop reason: DRUGHIGH

## 2019-08-30 NOTE — TELEPHONE ENCOUNTER
A 30 day supply is given, patient is due for an office visit. Patient has appointment scheduled for 9/11/2019  JOSESITO Stewart, RN  [employer] [facility] [facility]

## 2019-09-11 ENCOUNTER — OFFICE VISIT (OUTPATIENT)
Dept: FAMILY MEDICINE | Facility: CLINIC | Age: 44
End: 2019-09-11
Payer: COMMERCIAL

## 2019-09-11 VITALS
OXYGEN SATURATION: 98 % | BODY MASS INDEX: 27.97 KG/M2 | HEIGHT: 73 IN | TEMPERATURE: 98 F | RESPIRATION RATE: 16 BRPM | HEART RATE: 49 BPM | SYSTOLIC BLOOD PRESSURE: 127 MMHG | DIASTOLIC BLOOD PRESSURE: 82 MMHG | WEIGHT: 211.06 LBS

## 2019-09-11 DIAGNOSIS — Z00.00 ROUTINE GENERAL MEDICAL EXAMINATION AT A HEALTH CARE FACILITY: Primary | ICD-10-CM

## 2019-09-11 DIAGNOSIS — Z23 NEED FOR PROPHYLACTIC VACCINATION AND INOCULATION AGAINST INFLUENZA: ICD-10-CM

## 2019-09-11 DIAGNOSIS — Z13.220 LIPID SCREENING: ICD-10-CM

## 2019-09-11 DIAGNOSIS — R19.8 CHANGE IN BOWEL MOVEMENT: ICD-10-CM

## 2019-09-11 DIAGNOSIS — L29.0 ANAL PRURITUS: ICD-10-CM

## 2019-09-11 DIAGNOSIS — Z80.42 FAMILY HX OF PROSTATE CANCER: ICD-10-CM

## 2019-09-11 LAB
BASOPHILS # BLD AUTO: 0 10E9/L (ref 0–0.2)
BASOPHILS NFR BLD AUTO: 0.4 %
DIFFERENTIAL METHOD BLD: NORMAL
EOSINOPHIL # BLD AUTO: 0.1 10E9/L (ref 0–0.7)
EOSINOPHIL NFR BLD AUTO: 1.1 %
ERYTHROCYTE [DISTWIDTH] IN BLOOD BY AUTOMATED COUNT: 13 % (ref 10–15)
HCT VFR BLD AUTO: 43.1 % (ref 40–53)
HGB BLD-MCNC: 15.1 G/DL (ref 13.3–17.7)
LYMPHOCYTES # BLD AUTO: 1.8 10E9/L (ref 0.8–5.3)
LYMPHOCYTES NFR BLD AUTO: 32.6 %
MCH RBC QN AUTO: 32.5 PG (ref 26.5–33)
MCHC RBC AUTO-ENTMCNC: 35 G/DL (ref 31.5–36.5)
MCV RBC AUTO: 93 FL (ref 78–100)
MONOCYTES # BLD AUTO: 0.4 10E9/L (ref 0–1.3)
MONOCYTES NFR BLD AUTO: 7 %
NEUTROPHILS # BLD AUTO: 3.2 10E9/L (ref 1.6–8.3)
NEUTROPHILS NFR BLD AUTO: 58.9 %
PLATELET # BLD AUTO: 279 10E9/L (ref 150–450)
RBC # BLD AUTO: 4.65 10E12/L (ref 4.4–5.9)
WBC # BLD AUTO: 5.5 10E9/L (ref 4–11)

## 2019-09-11 PROCEDURE — 85025 COMPLETE CBC W/AUTO DIFF WBC: CPT | Performed by: INTERNAL MEDICINE

## 2019-09-11 PROCEDURE — 90471 IMMUNIZATION ADMIN: CPT | Performed by: INTERNAL MEDICINE

## 2019-09-11 PROCEDURE — 36415 COLL VENOUS BLD VENIPUNCTURE: CPT | Performed by: INTERNAL MEDICINE

## 2019-09-11 PROCEDURE — 99214 OFFICE O/P EST MOD 30 MIN: CPT | Mod: 25 | Performed by: INTERNAL MEDICINE

## 2019-09-11 PROCEDURE — 80053 COMPREHEN METABOLIC PANEL: CPT | Performed by: INTERNAL MEDICINE

## 2019-09-11 PROCEDURE — G0103 PSA SCREENING: HCPCS | Performed by: INTERNAL MEDICINE

## 2019-09-11 PROCEDURE — 80061 LIPID PANEL: CPT | Performed by: INTERNAL MEDICINE

## 2019-09-11 PROCEDURE — 84443 ASSAY THYROID STIM HORMONE: CPT | Performed by: INTERNAL MEDICINE

## 2019-09-11 PROCEDURE — 99396 PREV VISIT EST AGE 40-64: CPT | Mod: 25 | Performed by: INTERNAL MEDICINE

## 2019-09-11 PROCEDURE — 90686 IIV4 VACC NO PRSV 0.5 ML IM: CPT | Performed by: INTERNAL MEDICINE

## 2019-09-11 ASSESSMENT — MIFFLIN-ST. JEOR: SCORE: 1898.31

## 2019-09-11 ASSESSMENT — PAIN SCALES - GENERAL: PAINLEVEL: NO PAIN (0)

## 2019-09-11 NOTE — PROGRESS NOTES
SUBJECTIVE:   CC: Florentin Wetzel is an 44 year old male who presents for preventive health visit.       Patient is concerned about the change in his bowel movement habits.  Whereas he would just move his bowels once a day before, now he moves his bowels twice a day.  He also noted that the caliber of the stools is not as sizable as before.  Stools are not pencil-thin.  Otherwise, he denies experiencing any abdominal pain, nausea/vomiting, constipation/diarrhea, blood in his stools, or other GI symptoms.    Also experiencing anal pruritus particularly in the evening.  On further questioning, he does feel a crawling perianal sensation but has not found any helminths.    Lastly, he is concerned about his family history of early-age prostate cancer.  He denies experiencing any genitourinary symptoms.      Healthy Habits:    Do you get at least three servings of calcium containing foods daily (dairy, green leafy vegetables, etc.)? yes    Amount of exercise or daily activities, outside of work: 1=2 day(s) per week    Problems taking medications regularly No    Medication side effects: No    Have you had an eye exam in the past two years? no    Do you see a dentist twice per year? no    Do you have sleep apnea, excessive snoring or daytime drowsiness?yes      Today's PHQ-2 Score:   PHQ-2 ( 1999 Pfizer) 9/11/2019 9/25/2018   Q1: Little interest or pleasure in doing things 0 0   Q2: Feeling down, depressed or hopeless 0 0   PHQ-2 Score 0 0       Abuse: Current or Past(Physical, Sexual or Emotional)- No  Do you feel safe in your environment? Yes    Social History     Tobacco Use     Smoking status: Never Smoker     Smokeless tobacco: Never Used     Tobacco comment: socially   Substance Use Topics     Alcohol use: Yes     Comment: 1-2 drinks per day     If you drink alcohol do you typically have >3 drinks per day or >7 drinks per week? No                        Reviewed orders with patient. Reviewed health maintenance and  updated orders accordingly - Yes      Reviewed and updated as needed this visit by clinical staff  Tobacco  Allergies  Meds  Problems  Med Hx  Surg Hx       Reviewed and updated as needed this visit by Provider  Allergies  Meds  Problems  Med Hx  Surg Hx        Past Medical History:   Diagnosis Date     Contact dermatitis and other eczema, due to unspecified cause      Pure hypercholesterolemia        Past Surgical History:   Procedure Laterality Date     bone marrow harvest for donation         Family History   Problem Relation Age of Onset     Hypertension Mother      Lipids Mother      Other Cancer Mother         skin CA     Diabetes Father         type 2     Hypertension Father      Lipids Father      Arthritis Maternal Grandmother      Alcohol/Drug Maternal Grandfather      Alcohol/Drug Paternal Grandfather      Allergies Brother      Coronary Artery Disease Other         uncertain     Prostate Cancer Paternal Uncle      C.A.D. No family hx of      Cancer - colorectal No family hx of      Colon Cancer No family hx of      Cerebrovascular Disease No family hx of        Social History     Tobacco Use     Smoking status: Never Smoker     Smokeless tobacco: Never Used     Tobacco comment: socially   Substance Use Topics     Alcohol use: Yes     Comment: 1-2 drinks per day       Review of Systems   Constitutional: Negative for chills, fatigue and fever.   HENT: Negative for congestion, ear pain, hearing loss, sore throat and trouble swallowing.    Eyes: Negative for pain and visual disturbance.   Respiratory: Negative for cough and shortness of breath.    Cardiovascular: Negative for chest pain and palpitations.   Gastrointestinal: Negative for abdominal pain, blood in stool, constipation, diarrhea, nausea and vomiting.   Genitourinary: Negative for difficulty urinating and testicular pain.   Musculoskeletal: Negative for arthralgias, back pain, myalgias and neck pain.   Skin: Negative for rash.  "  Neurological: Negative for dizziness, light-headedness, numbness and headaches.   Psychiatric/Behavioral: Negative for sleep disturbance. The patient is not nervous/anxious.        OBJECTIVE:   /82 (BP Location: Right arm, Patient Position: Sitting, Cuff Size: Adult Regular)   Pulse (!) 49   Temp 98  F (36.7  C) (Oral)   Resp 16   Ht 1.842 m (6' 0.5\")   Wt 95.7 kg (211 lb 1 oz)   SpO2 98%   BMI 28.23 kg/m      Physical Exam   Constitutional: He is oriented to person, place, and time. No distress.   HENT:   Right Ear: External ear normal.   Left Ear: External ear normal.   Mouth/Throat: Oropharynx is clear and moist.   Eyes: Pupils are equal, round, and reactive to light. Conjunctivae are normal.   Neck: No thyromegaly present.   Cardiovascular: Normal rate, regular rhythm and normal heart sounds.   Pulmonary/Chest: Effort normal and breath sounds normal. No respiratory distress.   Abdominal: Soft. There is no tenderness.   Genitourinary: No discharge found.   Genitourinary Comments: Mild perianal erythematous rash; otherwise, normal anal exam   Musculoskeletal: Normal range of motion. He exhibits no edema or tenderness.   Lymphadenopathy:     He has no cervical adenopathy.   Neurological: He is alert and oriented to person, place, and time. Coordination normal.   Skin: No rash noted.   Psychiatric: He has a normal mood and affect.   Nursing note and vitals reviewed.      ASSESSMENT/PLAN:       ICD-10-CM    1. Routine general medical examination at a health care facility Z00.00 CBC with platelets differential     Comprehensive metabolic panel   2. Change in bowel movement R19.8 Comprehensive metabolic panel     TSH with free T4 reflex   3. Anal pruritus L29.0 Pinworm exam     hydrocortisone (ANUSOL-HC) 2.5 % cream   4. Family hx of prostate cancer Z80.42 Prostate spec antigen screen   5. Lipid screening Z13.220 Lipid panel reflex to direct LDL Fasting   6. Need for prophylactic vaccination and " inoculation against influenza Z23 Vaccine Administration, Initial [30969]       Patient Instructions   Call doctor if you develop any rash at the sensitive area at your left lower back.    Inform doctor if you develop any other changes to your bowel movements or stool character.    Apply prescribed steroid cream as needed for anal itching.  Call doctor if the itching persists or if you develop any new symptoms.    Maintain low fat/calorie diet and regular exercise.    Do not forget to return the kit for the pinworm exam to the laboratory.    Follow up yearly or as needed.        Preventive Health Recommendations  Male Ages 40 to 49    Yearly exam:             See your health care provider every year in order to  o   Review health changes.   o   Discuss preventive care.    o   Review your medicines if your doctor has prescribed any.    You should be tested each year for STDs (sexually transmitted diseases) if you re at risk.     Have a cholesterol test every 5 years.     Have a colonoscopy (test for colon cancer) if someone in your family has had colon cancer or polyps before age 50.     After age 45, have a diabetes test (fasting glucose). If you are at risk for diabetes, you should have this test every 3 years.      Talk with your health care provider about whether or not a prostate cancer screening test (PSA) is right for you.    Shots: Get a flu shot each year. Get a tetanus shot every 10 years.     Nutrition:    Eat at least 5 servings of fruits and vegetables daily.     Eat whole-grain bread, whole-wheat pasta and brown rice instead of white grains and rice.     Get adequate Calcium and Vitamin D.     Lifestyle    Exercise for at least 150 minutes a week (30 minutes a day, 5 days a week). This will help you control your weight and prevent disease.     Limit alcohol to one drink per day.     No smoking.     Wear sunscreen to prevent skin cancer.     See your dentist every six months for an exam and cleaning.     "        COUNSELING:  Reviewed preventive health counseling, as reflected in patient instructions    Estimated body mass index is 28.23 kg/m  as calculated from the following:    Height as of this encounter: 1.842 m (6' 0.5\").    Weight as of this encounter: 95.7 kg (211 lb 1 oz).    Weight management plan: Discussed healthy diet and exercise guidelines     reports that he has never smoked. He has never used smokeless tobacco.      Counseling Resources:  ATP IV Guidelines  Pooled Cohorts Equation Calculator  FRAX Risk Assessment  ICSI Preventive Guidelines  Dietary Guidelines for Americans, 2010  USDA's MyPlate  ASA Prophylaxis  Lung CA Screening    Bong Lowry MD  [facility]  "

## 2019-09-11 NOTE — PATIENT INSTRUCTIONS
Call doctor if you develop any rash at the sensitive area at your left lower back.    Inform doctor if you develop any other changes to your bowel movements or stool character.    Apply prescribed steroid cream as needed for anal itching.  Call doctor if the itching persists or if you develop any new symptoms.    Maintain low fat/calorie diet and regular exercise.    Do not forget to return the kit for the pinworm exam to the laboratory.    Follow up yearly or as needed.        Preventive Health Recommendations  Male Ages 40 to 49    Yearly exam:             See your health care provider every year in order to  o   Review health changes.   o   Discuss preventive care.    o   Review your medicines if your doctor has prescribed any.    You should be tested each year for STDs (sexually transmitted diseases) if you re at risk.     Have a cholesterol test every 5 years.     Have a colonoscopy (test for colon cancer) if someone in your family has had colon cancer or polyps before age 50.     After age 45, have a diabetes test (fasting glucose). If you are at risk for diabetes, you should have this test every 3 years.      Talk with your health care provider about whether or not a prostate cancer screening test (PSA) is right for you.    Shots: Get a flu shot each year. Get a tetanus shot every 10 years.     Nutrition:    Eat at least 5 servings of fruits and vegetables daily.     Eat whole-grain bread, whole-wheat pasta and brown rice instead of white grains and rice.     Get adequate Calcium and Vitamin D.     Lifestyle    Exercise for at least 150 minutes a week (30 minutes a day, 5 days a week). This will help you control your weight and prevent disease.     Limit alcohol to one drink per day.     No smoking.     Wear sunscreen to prevent skin cancer.     See your dentist every six months for an exam and cleaning.

## 2019-09-11 NOTE — NURSING NOTE
"Chief Complaint   Patient presents with     Physical     patient is fasting     /82 (BP Location: Right arm, Patient Position: Sitting, Cuff Size: Adult Regular)   Pulse (!) 49   Temp 98  F (36.7  C) (Oral)   Resp 16   Ht 1.842 m (6' 0.5\")   Wt 95.7 kg (211 lb 1 oz)   SpO2 98%   BMI 28.23 kg/m   Estimated body mass index is 28.23 kg/m  as calculated from the following:    Height as of this encounter: 1.842 m (6' 0.5\").    Weight as of this encounter: 95.7 kg (211 lb 1 oz).  bp completed using cuff size: regular      Health Maintenance addressed:  NONE    N/a  Sanjana Lanier CMA on 9/11/2019 at 11:20 AM                "

## 2019-09-12 DIAGNOSIS — E78.5 HYPERLIPIDEMIA, UNSPECIFIED HYPERLIPIDEMIA TYPE: Primary | ICD-10-CM

## 2019-09-12 DIAGNOSIS — R74.8 ELEVATED LIVER ENZYMES: ICD-10-CM

## 2019-09-12 LAB
ALBUMIN SERPL-MCNC: 4.2 G/DL (ref 3.4–5)
ALP SERPL-CCNC: 81 U/L (ref 40–150)
ALT SERPL W P-5'-P-CCNC: 124 U/L (ref 0–70)
ANION GAP SERPL CALCULATED.3IONS-SCNC: 6 MMOL/L (ref 3–14)
AST SERPL W P-5'-P-CCNC: 50 U/L (ref 0–45)
BILIRUB SERPL-MCNC: 0.7 MG/DL (ref 0.2–1.3)
BUN SERPL-MCNC: 13 MG/DL (ref 7–30)
CALCIUM SERPL-MCNC: 8.5 MG/DL (ref 8.5–10.1)
CHLORIDE SERPL-SCNC: 109 MMOL/L (ref 94–109)
CHOLEST SERPL-MCNC: 160 MG/DL
CO2 SERPL-SCNC: 23 MMOL/L (ref 20–32)
CREAT SERPL-MCNC: 0.84 MG/DL (ref 0.66–1.25)
GFR SERPL CREATININE-BSD FRML MDRD: >90 ML/MIN/{1.73_M2}
GLUCOSE SERPL-MCNC: 97 MG/DL (ref 70–99)
HDLC SERPL-MCNC: 41 MG/DL
LDLC SERPL CALC-MCNC: 77 MG/DL
NONHDLC SERPL-MCNC: 119 MG/DL
POTASSIUM SERPL-SCNC: 4.1 MMOL/L (ref 3.4–5.3)
PROT SERPL-MCNC: 7.8 G/DL (ref 6.8–8.8)
PSA SERPL-ACNC: 0.62 UG/L (ref 0–4)
SODIUM SERPL-SCNC: 138 MMOL/L (ref 133–144)
TRIGL SERPL-MCNC: 211 MG/DL
TSH SERPL DL<=0.005 MIU/L-ACNC: 0.96 MU/L (ref 0.4–4)

## 2019-09-12 RX ORDER — ATORVASTATIN CALCIUM 20 MG/1
20 TABLET, FILM COATED ORAL DAILY
Qty: 90 TABLET | Refills: 0 | Status: SHIPPED | OUTPATIENT
Start: 2019-09-12 | End: 2020-02-10

## 2019-09-19 ASSESSMENT — ENCOUNTER SYMPTOMS
NERVOUS/ANXIOUS: 0
SHORTNESS OF BREATH: 0
SORE THROAT: 0
CHILLS: 0
PALPITATIONS: 0
DIARRHEA: 0
LIGHT-HEADEDNESS: 0
NAUSEA: 0
BACK PAIN: 0
FEVER: 0
MYALGIAS: 0
CONSTIPATION: 0
HEADACHES: 0
BLOOD IN STOOL: 0
TROUBLE SWALLOWING: 0
VOMITING: 0
COUGH: 0
NECK PAIN: 0
SLEEP DISTURBANCE: 0
ABDOMINAL PAIN: 0
ARTHRALGIAS: 0
FATIGUE: 0
DIZZINESS: 0
DIFFICULTY URINATING: 0
EYE PAIN: 0
NUMBNESS: 0

## 2020-01-29 DIAGNOSIS — R74.8 ELEVATED LIVER ENZYMES: ICD-10-CM

## 2020-01-29 DIAGNOSIS — E78.5 HYPERLIPIDEMIA, UNSPECIFIED HYPERLIPIDEMIA TYPE: ICD-10-CM

## 2020-01-29 LAB
ALBUMIN SERPL-MCNC: 4.1 G/DL (ref 3.4–5)
ALP SERPL-CCNC: 73 U/L (ref 40–150)
ALT SERPL W P-5'-P-CCNC: 52 U/L (ref 0–70)
AST SERPL W P-5'-P-CCNC: 31 U/L (ref 0–45)
BILIRUB DIRECT SERPL-MCNC: 0.1 MG/DL (ref 0–0.2)
BILIRUB SERPL-MCNC: 0.4 MG/DL (ref 0.2–1.3)
CHOLEST SERPL-MCNC: 170 MG/DL
HDLC SERPL-MCNC: 37 MG/DL
LDLC SERPL CALC-MCNC: 95 MG/DL
NONHDLC SERPL-MCNC: 133 MG/DL
PROT SERPL-MCNC: 7.9 G/DL (ref 6.8–8.8)
TRIGL SERPL-MCNC: 188 MG/DL

## 2020-01-29 PROCEDURE — 80061 LIPID PANEL: CPT | Performed by: INTERNAL MEDICINE

## 2020-01-29 PROCEDURE — 80076 HEPATIC FUNCTION PANEL: CPT | Performed by: INTERNAL MEDICINE

## 2020-01-29 PROCEDURE — 36415 COLL VENOUS BLD VENIPUNCTURE: CPT | Performed by: INTERNAL MEDICINE

## 2020-02-10 ENCOUNTER — MYC REFILL (OUTPATIENT)
Dept: FAMILY MEDICINE | Facility: CLINIC | Age: 45
End: 2020-02-10

## 2020-02-10 DIAGNOSIS — E78.5 HYPERLIPIDEMIA, UNSPECIFIED HYPERLIPIDEMIA TYPE: ICD-10-CM

## 2020-02-10 RX ORDER — ATORVASTATIN CALCIUM 20 MG/1
20 TABLET, FILM COATED ORAL DAILY
Qty: 90 TABLET | Refills: 3 | Status: SHIPPED | OUTPATIENT
Start: 2020-02-10 | End: 2020-08-11

## 2020-02-10 NOTE — TELEPHONE ENCOUNTER
"Last Written Prescription Date:  9/12/19  Last Fill Quantity: 90,  # refills: 0   Last office visit: 9/11/2019 with prescribing provider:     Future Office Visit:    Requested Prescriptions   Pending Prescriptions Disp Refills     atorvastatin (LIPITOR) 20 MG tablet 90 tablet 0     Sig: Take 1 tablet (20 mg) by mouth daily Schedule a laboratory-only visit for December your blood tests (please come in fasting).       Statins Protocol Passed - 2/10/2020  8:36 AM        Passed - LDL on file in past 12 months     Recent Labs   Lab Test 01/29/20  0817   LDL 95             Passed - No abnormal creatine kinase in past 12 months     No lab results found.             Passed - Recent (12 mo) or future (30 days) visit within the authorizing provider's specialty     Patient has had an office visit with the authorizing provider or a provider within the authorizing providers department within the previous 12 mos or has a future within next 30 days. See \"Patient Info\" tab in inbasket, or \"Choose Columns\" in Meds & Orders section of the refill encounter.              Passed - Medication is active on med list        Passed - Patient is age 18 or older          "

## 2021-01-15 ENCOUNTER — HEALTH MAINTENANCE LETTER (OUTPATIENT)
Age: 46
End: 2021-01-15

## 2021-02-01 DIAGNOSIS — E78.5 HYPERLIPIDEMIA, UNSPECIFIED HYPERLIPIDEMIA TYPE: ICD-10-CM

## 2021-02-03 NOTE — TELEPHONE ENCOUNTER
[employer] message sent asking pt to schedule visit to establish with new PCP     Pended 30 day nancy refill in the meantime     Routing refill request to provider for review/approval because:  Patient needs to be seen because it has been more than 1 year since last office visit.

## 2021-02-04 RX ORDER — ATORVASTATIN CALCIUM 20 MG/1
20 TABLET, FILM COATED ORAL DAILY
Qty: 30 TABLET | Refills: 0 | Status: SHIPPED | OUTPATIENT
Start: 2021-02-04 | End: 2021-05-06

## 2021-06-03 ENCOUNTER — OFFICE VISIT (OUTPATIENT)
Dept: FAMILY MEDICINE | Facility: CLINIC | Age: 46
End: 2021-06-03
Payer: COMMERCIAL

## 2021-06-03 VITALS
WEIGHT: 206.2 LBS | HEIGHT: 73 IN | SYSTOLIC BLOOD PRESSURE: 122 MMHG | HEART RATE: 60 BPM | TEMPERATURE: 96.6 F | DIASTOLIC BLOOD PRESSURE: 84 MMHG | BODY MASS INDEX: 27.33 KG/M2 | OXYGEN SATURATION: 97 %

## 2021-06-03 DIAGNOSIS — Z00.00 ENCOUNTER FOR PREVENTIVE HEALTH EXAMINATION: Primary | ICD-10-CM

## 2021-06-03 DIAGNOSIS — Z80.8 FH: SKIN CANCER: ICD-10-CM

## 2021-06-03 LAB
ALBUMIN SERPL-MCNC: 4 G/DL (ref 3.4–5)
ALP SERPL-CCNC: 74 U/L (ref 40–150)
ALT SERPL W P-5'-P-CCNC: 83 U/L (ref 0–70)
ANION GAP SERPL CALCULATED.3IONS-SCNC: 1 MMOL/L (ref 3–14)
AST SERPL W P-5'-P-CCNC: 40 U/L (ref 0–45)
BASOPHILS # BLD AUTO: 0 10E9/L (ref 0–0.2)
BASOPHILS NFR BLD AUTO: 0.6 %
BILIRUB SERPL-MCNC: 0.9 MG/DL (ref 0.2–1.3)
BUN SERPL-MCNC: 15 MG/DL (ref 7–30)
CALCIUM SERPL-MCNC: 8.9 MG/DL (ref 8.5–10.1)
CHLORIDE SERPL-SCNC: 107 MMOL/L (ref 94–109)
CHOLEST SERPL-MCNC: 195 MG/DL
CO2 SERPL-SCNC: 30 MMOL/L (ref 20–32)
CREAT SERPL-MCNC: 1.03 MG/DL (ref 0.66–1.25)
DIFFERENTIAL METHOD BLD: NORMAL
EOSINOPHIL # BLD AUTO: 0.1 10E9/L (ref 0–0.7)
EOSINOPHIL NFR BLD AUTO: 1.7 %
ERYTHROCYTE [DISTWIDTH] IN BLOOD BY AUTOMATED COUNT: 13.2 % (ref 10–15)
GFR SERPL CREATININE-BSD FRML MDRD: 87 ML/MIN/{1.73_M2}
GLUCOSE SERPL-MCNC: 105 MG/DL (ref 70–99)
HCT VFR BLD AUTO: 44.4 % (ref 40–53)
HDLC SERPL-MCNC: 35 MG/DL
HGB BLD-MCNC: 15.5 G/DL (ref 13.3–17.7)
LDLC SERPL CALC-MCNC: 103 MG/DL
LYMPHOCYTES # BLD AUTO: 1.5 10E9/L (ref 0.8–5.3)
LYMPHOCYTES NFR BLD AUTO: 30.8 %
MCH RBC QN AUTO: 32 PG (ref 26.5–33)
MCHC RBC AUTO-ENTMCNC: 34.9 G/DL (ref 31.5–36.5)
MCV RBC AUTO: 92 FL (ref 78–100)
MONOCYTES # BLD AUTO: 0.4 10E9/L (ref 0–1.3)
MONOCYTES NFR BLD AUTO: 8 %
NEUTROPHILS # BLD AUTO: 2.8 10E9/L (ref 1.6–8.3)
NEUTROPHILS NFR BLD AUTO: 58.9 %
NONHDLC SERPL-MCNC: 160 MG/DL
PLATELET # BLD AUTO: 301 10E9/L (ref 150–450)
POTASSIUM SERPL-SCNC: 4.3 MMOL/L (ref 3.4–5.3)
PROT SERPL-MCNC: 7.9 G/DL (ref 6.8–8.8)
RBC # BLD AUTO: 4.85 10E12/L (ref 4.4–5.9)
SODIUM SERPL-SCNC: 138 MMOL/L (ref 133–144)
TRIGL SERPL-MCNC: 283 MG/DL
TSH SERPL DL<=0.005 MIU/L-ACNC: 0.98 MU/L (ref 0.4–4)
WBC # BLD AUTO: 4.7 10E9/L (ref 4–11)

## 2021-06-03 PROCEDURE — 80050 GENERAL HEALTH PANEL: CPT | Performed by: INTERNAL MEDICINE

## 2021-06-03 PROCEDURE — 99213 OFFICE O/P EST LOW 20 MIN: CPT | Mod: 25 | Performed by: INTERNAL MEDICINE

## 2021-06-03 PROCEDURE — 36415 COLL VENOUS BLD VENIPUNCTURE: CPT | Performed by: INTERNAL MEDICINE

## 2021-06-03 PROCEDURE — 99396 PREV VISIT EST AGE 40-64: CPT | Performed by: INTERNAL MEDICINE

## 2021-06-03 PROCEDURE — 80061 LIPID PANEL: CPT | Performed by: INTERNAL MEDICINE

## 2021-06-03 PROCEDURE — G0103 PSA SCREENING: HCPCS | Performed by: INTERNAL MEDICINE

## 2021-06-03 SDOH — HEALTH STABILITY: MENTAL HEALTH: HOW OFTEN DO YOU HAVE 6 OR MORE DRINKS ON ONE OCCASION?: NOT ASKED

## 2021-06-03 SDOH — HEALTH STABILITY: MENTAL HEALTH: HOW OFTEN DO YOU HAVE A DRINK CONTAINING ALCOHOL?: NOT ASKED

## 2021-06-03 SDOH — HEALTH STABILITY: MENTAL HEALTH: HOW MANY STANDARD DRINKS CONTAINING ALCOHOL DO YOU HAVE ON A TYPICAL DAY?: NOT ASKED

## 2021-06-03 ASSESSMENT — MIFFLIN-ST. JEOR: SCORE: 1879.2

## 2021-06-03 NOTE — PATIENT INSTRUCTIONS
Mr. Wetzel;  You appear to be doing well.    An active lifestyle will help your cholesterol levels as will your medications.    I would like to obtain some screening tests today.  A PSA will be added purely because of your perineal discomfort with defecation.    Best regards  Jorge

## 2021-06-03 NOTE — PROGRESS NOTES
SUBJECTIVE:   CC: Florentin Wetzel is an 45 year old male who presents for preventative health visit.  Patient is doing well.  No particular concerns.  He does have a family history of skin cancer and has had a couple of moles removed historically.    Rarely he has perineal discomfort.  He states he notices that on defecation.  He wonders whether or not he may have a hernia.  He states that the discomfort is in the perianal to base of the scrotum region    Otherwise doing well.  Working from home.  Does have a history of hyperlipidemia most recent cholesterol levels reasonably controlled.  Triglycerides continue to be mildly high and HDL mildly diminished      Patient has been advised of split billing requirements and indicates understanding: Yes  Healthy Habits:     Getting at least 3 servings of Calcium per day:  Yes    Bi-annual eye exam:  NO    Dental care twice a year:  NO    Sleep apnea or symptoms of sleep apnea:  None    Diet:  Regular (no restrictions)    Frequency of exercise:  None    Duration of exercise:  N/A    Taking medications regularly:  No    Barriers to taking medications:  None    Medication side effects:  None    PHQ-2 Total Score: 0    Additional concerns today:  Yes (Derm scalp, med renew)              Today's PHQ-2 Score:   PHQ-2 ( 1999 Pfizer) 9/11/2019   Q1: Little interest or pleasure in doing things 0   Q2: Feeling down, depressed or hopeless 0   PHQ-2 Score 0       Abuse: Current or Past(Physical, Sexual or Emotional)- No  Do you feel safe in your environment?yes    Have you ever done Advance Care Planning? (For example, a Health Directive, POLST, or a discussion with a medical provider or your loved ones about your wishes): No, advance care planning information given to patient to review.  Patient plans to discuss their wishes with loved ones or provider.      Social History     Tobacco Use     Smoking status: Never Smoker     Smokeless tobacco: Never Used     Tobacco comment: socially    Substance Use Topics     Alcohol use: Yes     Comment: 1-2 drinks per day     If you drink alcohol do you typically have >3 drinks per day or >7 drinks per week? No    No flowsheet data found.    Last PSA:   PSA   Date Value Ref Range Status   09/11/2019 0.62 0 - 4 ug/L Final     Comment:     Assay Method:  Chemiluminescence using Siemens Vista analyzer       Reviewed orders with patient. Reviewed health maintenance and updated orders accordingly - Yes  Lab work is in process    Reviewed and updated as needed this visit by clinical staff                 Reviewed and updated as needed this visit by Provider                Past Medical History:   Diagnosis Date     Contact dermatitis and other eczema, due to unspecified cause      Pure hypercholesterolemia         Review of Systems  CONSTITUTIONAL: NEGATIVE for fever, chills, change in weight  INTEGUMENTARY/SKIN: NEGATIVE for worrisome rashes, moles or lesions  EYES: NEGATIVE for vision changes or irritation  ENT: NEGATIVE for ear, mouth and throat problems  RESP: NEGATIVE for significant cough or SOB  CV: NEGATIVE for chest pain, palpitations or peripheral edema  GI: NEGATIVE for nausea, abdominal pain, heartburn, or change in bowel habits   male: negative for dysuria, hematuria, decreased urinary stream, erectile dysfunction, urethral discharge  MUSCULOSKELETAL: NEGATIVE for significant arthralgias or myalgia  NEURO: NEGATIVE for weakness, dizziness or paresthesias  PSYCHIATRIC: NEGATIVE for changes in mood or affect    OBJECTIVE:   There were no vitals taken for this visit.    Physical Exam  GENERAL: healthy, alert and no distress  EYES: Eyes grossly normal to inspection, PERRL and conjunctivae and sclerae normal  HENT: ear canals and TM's normal, nose and mouth without ulcers or lesions  NECK: no adenopathy, no asymmetry, masses, or scars and thyroid normal to palpation  RESP: lungs clear to auscultation - no rales, rhonchi or wheezes  CV: regular rate and  "rhythm, normal S1 S2, no S3 or S4, no murmur, click or rub, no peripheral edema and peripheral pulses strong  ABDOMEN: soft, nontender, no hepatosplenomegaly, no masses and bowel sounds normal  MS: no gross musculoskeletal defects noted, no edema  SKIN: no suspicious lesions or rashes  NEURO: Normal strength and tone, mentation intact and speech normal  PSYCH: mentation appears normal, affect normal/bright  Rectal examination reveals some redundant tissue, prostate is smooth and soft.  Examination limited by patient's discomfort  Diagnostic Test Results:  Labs reviewed in Epic    ASSESSMENT/PLAN:       ICD-10-CM    1. Encounter for preventive health examination  Z00.00 Comprehensive metabolic panel (BMP + Alb, Alk Phos, ALT, AST, Total. Bili, TP)     CBC with platelets and differential     TSH with free T4 reflex     Lipid panel reflex to direct LDL Fasting     PSA, screen   2. FH: skin cancer  Z80.8 ADULT DERMATOLOGY REFERRAL     Perianal pain consistent more with the pudendal nerve kind of impingement.  This versus internal hemorrhoid.  No evidence of herniation on examination today  Patient has been advised of split billing requirements and indicates understanding: Yes  COUNSELING:   Reviewed preventive health counseling, as reflected in patient instructions    Estimated body mass index is 28.23 kg/m  as calculated from the following:    Height as of 9/11/19: 1.842 m (6' 0.5\").    Weight as of 9/11/19: 95.7 kg (211 lb 1 oz).         He reports that he has never smoked. He has never used smokeless tobacco.      Counseling Resources:  ATP IV Guidelines  Pooled Cohorts Equation Calculator  FRAX Risk Assessment  ICSI Preventive Guidelines  Dietary Guidelines for Americans, 2010  USDA's MyPlate  ASA Prophylaxis  Lung CA Screening    Jorge Richardson MD  [facility]  "

## 2021-06-04 LAB — PSA SERPL-ACNC: 0.66 UG/L (ref 0–4)

## 2021-06-09 DIAGNOSIS — E78.5 HYPERLIPIDEMIA, UNSPECIFIED HYPERLIPIDEMIA TYPE: ICD-10-CM

## 2021-06-09 NOTE — TELEPHONE ENCOUNTER
Atorvastatin 20 mg tablets    Summary: Take 1 tablet (20 mg) by mouth daily, Disp-30 tablet, R-0, E-Prescribe   Dose, Route, Frequency: 20 mg, Oral, DAILY  Start: 5/7/2021  Ord/Sold: 5/7/2021

## 2021-06-10 RX ORDER — ATORVASTATIN CALCIUM 20 MG/1
20 TABLET, FILM COATED ORAL DAILY
Qty: 90 TABLET | Refills: 3 | Status: SHIPPED | OUTPATIENT
Start: 2021-06-10 | End: 2022-08-06

## 2021-09-04 ENCOUNTER — HEALTH MAINTENANCE LETTER (OUTPATIENT)
Age: 46
End: 2021-09-04

## 2022-08-04 DIAGNOSIS — E78.5 HYPERLIPIDEMIA, UNSPECIFIED HYPERLIPIDEMIA TYPE: ICD-10-CM

## 2022-08-05 NOTE — TELEPHONE ENCOUNTER
Routing refill request to provider for review/approval because:  Labs out of range:    LDL Cholesterol Calculated   Date Value Ref Range Status   06/03/2021 103 (H) <100 mg/dL Final     Comment:     Above desirable:  100-129 mg/dl  Borderline High:  130-159 mg/dL  High:             160-189 mg/dL  Very high:       >189 mg/dl      Pt due for an OV.   Last ordered by Dr. Willis. Routing to PCP to review and advise.

## 2022-08-06 ENCOUNTER — HEALTH MAINTENANCE LETTER (OUTPATIENT)
Age: 47
End: 2022-08-06

## 2022-08-06 RX ORDER — ATORVASTATIN CALCIUM 20 MG/1
20 TABLET, FILM COATED ORAL DAILY
Qty: 90 TABLET | Refills: 3 | Status: SHIPPED | OUTPATIENT
Start: 2022-08-06 | End: 2023-09-13

## 2022-10-22 ENCOUNTER — HEALTH MAINTENANCE LETTER (OUTPATIENT)
Age: 47
End: 2022-10-22

## 2022-10-24 ENCOUNTER — OFFICE VISIT (OUTPATIENT)
Dept: FAMILY MEDICINE | Facility: CLINIC | Age: 47
End: 2022-10-24
Payer: COMMERCIAL

## 2022-10-24 VITALS
DIASTOLIC BLOOD PRESSURE: 72 MMHG | OXYGEN SATURATION: 98 % | WEIGHT: 211 LBS | SYSTOLIC BLOOD PRESSURE: 104 MMHG | HEIGHT: 73 IN | TEMPERATURE: 97.5 F | BODY MASS INDEX: 27.96 KG/M2 | HEART RATE: 52 BPM | RESPIRATION RATE: 18 BRPM

## 2022-10-24 DIAGNOSIS — L20.84 INTRINSIC ECZEMA: ICD-10-CM

## 2022-10-24 DIAGNOSIS — R07.89 ATYPICAL CHEST PAIN: ICD-10-CM

## 2022-10-24 DIAGNOSIS — Z80.8 FAMILY HISTORY OF SKIN CANCER: ICD-10-CM

## 2022-10-24 DIAGNOSIS — Z00.00 ROUTINE GENERAL MEDICAL EXAMINATION AT A HEALTH CARE FACILITY: Primary | ICD-10-CM

## 2022-10-24 DIAGNOSIS — S09.93XS LIP INJURY, SEQUELA: ICD-10-CM

## 2022-10-24 DIAGNOSIS — Z12.11 SCREEN FOR COLON CANCER: ICD-10-CM

## 2022-10-24 DIAGNOSIS — Z23 NEED FOR VACCINATION: ICD-10-CM

## 2022-10-24 DIAGNOSIS — E78.2 MIXED HYPERLIPIDEMIA: ICD-10-CM

## 2022-10-24 DIAGNOSIS — Z13.1 SCREENING FOR DIABETES MELLITUS: ICD-10-CM

## 2022-10-24 PROCEDURE — 99214 OFFICE O/P EST MOD 30 MIN: CPT | Mod: 25 | Performed by: PHYSICIAN ASSISTANT

## 2022-10-24 PROCEDURE — 99396 PREV VISIT EST AGE 40-64: CPT | Performed by: PHYSICIAN ASSISTANT

## 2022-10-24 PROCEDURE — 80053 COMPREHEN METABOLIC PANEL: CPT | Performed by: PHYSICIAN ASSISTANT

## 2022-10-24 PROCEDURE — 80061 LIPID PANEL: CPT | Performed by: PHYSICIAN ASSISTANT

## 2022-10-24 PROCEDURE — 36415 COLL VENOUS BLD VENIPUNCTURE: CPT | Performed by: PHYSICIAN ASSISTANT

## 2022-10-24 ASSESSMENT — ENCOUNTER SYMPTOMS
JOINT SWELLING: 0
SORE THROAT: 0
CONSTIPATION: 0
ARTHRALGIAS: 0
DYSURIA: 0
NAUSEA: 0
ABDOMINAL PAIN: 0
HEMATOCHEZIA: 0
HEARTBURN: 0
FREQUENCY: 0
CHILLS: 0
WEAKNESS: 0
SHORTNESS OF BREATH: 0
DIZZINESS: 0
MYALGIAS: 0
PARESTHESIAS: 0
PALPITATIONS: 1
COUGH: 0
DIARRHEA: 0
HEADACHES: 0
HEMATURIA: 0
FEVER: 0
NERVOUS/ANXIOUS: 0
EYE PAIN: 0

## 2022-10-24 ASSESSMENT — PAIN SCALES - GENERAL: PAINLEVEL: NO PAIN (0)

## 2022-10-24 NOTE — PROGRESS NOTES
"SUBJECTIVE:   CC: Florentin is an 47 year old who presents for preventative health visit.     Healthy Habits:     Getting at least 3 servings of Calcium per day:  Yes    Bi-annual eye exam:  Yes    Dental care twice a year:  NO    Sleep apnea or symptoms of sleep apnea:  Excessive snoring    Diet:  Regular (no restrictions)    Frequency of exercise:  1 day/week    Duration of exercise:  Less than 15 minutes    Taking medications regularly:  Yes    Medication side effects:  None    PHQ-2 Total Score: 0    Additional concerns today:  No     - Had blister/cyst on lower central lip several months ago. Bit it and it popped. Lip split and healed, but continues to have red paul in the area.  - Has dry patches of skin on bilat great toes. History eczema, but this rash doesn't itch and isn't bothersome.  - Feels \"air pocket\" in upper lateral left chest intermittently. Planning on running a 5K with his daughter so wants to make sure he is ok to run. No chest pain, TAPIA.      Today's PHQ-2 Score:   PHQ-2 ( 1999 Pfizer) 10/24/2022   Q1: Little interest or pleasure in doing things 0   Q2: Feeling down, depressed or hopeless 0   PHQ-2 Score 0   PHQ-2 Total Score (12-17 Years)- Positive if 3 or more points; Administer PHQ-A if positive -   Q1: Little interest or pleasure in doing things Not at all   Q2: Feeling down, depressed or hopeless Not at all   PHQ-2 Score 0     Do you feel safe in your environment? Yes    Social History     Tobacco Use     Smoking status: Never     Smokeless tobacco: Never     Tobacco comments:     socially   Substance Use Topics     Alcohol use: Yes     Comment: 6-8 drinks on wkends       Alcohol Use 10/24/2022   Prescreen: >3 drinks/day or >7 drinks/week? Yes   Prescreen: >3 drinks/day or >7 drinks/week? -   AUDIT SCORE  6     AUDIT - Alcohol Use Disorders Identification Test - Reproduced from the World Health Organization Audit 2001 (Second Edition) 10/24/2022   1.  How often do you have a drink containing " alcohol? 2 to 3 times a week   2.  How many drinks containing alcohol do you have on a typical day when you are drinking? 3 or 4   3.  How often do you have five or more drinks on one occasion? Less than monthly   4.  How often during the last year have you found that you were not able to stop drinking once you had started? Never   5.  How often during the last year have you failed to do what was normally expected of you because of drinking? Less than monthly   6.  How often during the last year have you needed a first drink in the morning to get yourself going after a heavy drinking session? Never   7.  How often during the last year have you had a feeling of guilt or remorse after drinking? Never   8.  How often during the last year have you been unable to remember what happened the night before because of your drinking? Never   9.  Have you or someone else been injured because of your drinking? No   10. Has a relative, friend, doctor or other health care worker been concerned about your drinking or suggested you cut down? No   TOTAL SCORE 6       Last PSA:   PSA   Date Value Ref Range Status   06/03/2021 0.66 0 - 4 ug/L Final     Comment:     Assay Method:  Chemiluminescence using Siemens Vista analyzer       Reviewed orders with patient. Reviewed health maintenance and updated orders accordingly - Yes  BP Readings from Last 3 Encounters:   10/24/22 104/72   06/03/21 122/84   09/11/19 127/82    Wt Readings from Last 3 Encounters:   10/24/22 95.7 kg (211 lb)   06/03/21 93.5 kg (206 lb 3.2 oz)   09/11/19 95.7 kg (211 lb 1 oz)                  Patient Active Problem List   Diagnosis     Hyperlipidemia, unspecified hyperlipidemia type     Cervicalgia     Past Surgical History:   Procedure Laterality Date     bone marrow harvest for donation         Social History     Tobacco Use     Smoking status: Never     Smokeless tobacco: Never     Tobacco comments:     socially   Substance Use Topics     Alcohol use: Yes      "Comment: 6-8 drinks on wkends     Family History   Problem Relation Age of Onset     Hypertension Mother      Lipids Mother      Other Cancer Mother         skin CA     Diabetes Father         type 2     Hypertension Father      Lipids Father      Arthritis Maternal Grandmother      Alcohol/Drug Maternal Grandfather      Alcohol/Drug Paternal Grandfather      Allergies Brother      Coronary Artery Disease Other         uncertain     Prostate Cancer Paternal Uncle      C.A.D. No family hx of      Cancer - colorectal No family hx of      Colon Cancer No family hx of      Cerebrovascular Disease No family hx of            Reviewed and updated as needed this visit by clinical staff   Tobacco  Allergies  Meds  Problems  Med Hx  Surg Hx  Fam Hx          Reviewed and updated as needed this visit by Provider   Tobacco  Allergies  Meds  Problems  Med Hx  Surg Hx  Fam Hx             Review of Systems   Constitutional: Negative for chills and fever.   HENT: Negative for congestion, ear pain, hearing loss and sore throat.    Eyes: Negative for pain and visual disturbance.   Respiratory: Negative for cough and shortness of breath.    Cardiovascular: Positive for palpitations. Negative for chest pain and peripheral edema.   Gastrointestinal: Negative for abdominal pain, constipation, diarrhea, heartburn, hematochezia and nausea.   Genitourinary: Negative for dysuria, frequency, genital sores, hematuria and urgency.   Musculoskeletal: Negative for arthralgias, joint swelling and myalgias.   Skin: Negative for rash.   Neurological: Negative for dizziness, weakness, headaches and paresthesias.   Psychiatric/Behavioral: Negative for mood changes. The patient is not nervous/anxious.        OBJECTIVE:   /72 (BP Location: Right arm, Patient Position: Sitting, Cuff Size: Adult Large)   Pulse 52   Temp 97.5  F (36.4  C) (Temporal)   Resp 18   Ht 1.854 m (6' 1\")   Wt 95.7 kg (211 lb)   SpO2 98%   BMI 27.84 kg/m  "     Physical Exam  GENERAL: healthy, alert and no distress  EYES: Eyes grossly normal to inspection, PERRL and conjunctivae and sclerae normal  HENT: ear canals and TM's normal, nose and mouth without ulcers or lesions  NECK: no adenopathy, no asymmetry, masses, or scars and thyroid normal to palpation  RESP: lungs clear to auscultation - no rales, rhonchi or wheezes  CV: regular rate and rhythm, normal S1 S2, no S3 or S4, no murmur, click or rub, no peripheral edema and peripheral pulses strong  MS: no gross musculoskeletal defects noted, no edema  SKIN: no suspicious lesions or rashes; healing laceration of lower lip. Well-demarcated scaly patch on dorsum of bilat great toes.  NEURO: Normal strength and tone, mentation intact and speech normal  PSYCH: mentation appears normal, affect normal/bright      ASSESSMENT/PLAN:       ICD-10-CM    1. Routine general medical examination at a health care facility  Z00.00       2. Lip injury, sequela  S09.93XS       3. Atypical chest pain  R07.89       4. Intrinsic eczema  L20.84       5. Mixed hyperlipidemia  E78.2 Lipid panel reflex to direct LDL Fasting     Comprehensive metabolic panel (BMP + Alb, Alk Phos, ALT, AST, Total. Bili, TP)     Lipid panel reflex to direct LDL Fasting     Comprehensive metabolic panel (BMP + Alb, Alk Phos, ALT, AST, Total. Bili, TP)      6. Family history of skin cancer  Z80.8 Adult Dermatology Referral      7. Screen for colon cancer  Z12.11 Colonoscopy Screening  Referral      8. Screening for diabetes mellitus  Z13.1 Comprehensive metabolic panel (BMP + Alb, Alk Phos, ALT, AST, Total. Bili, TP)     Comprehensive metabolic panel (BMP + Alb, Alk Phos, ALT, AST, Total. Bili, TP)      9. Need for vaccination  Z23         - Traumatic lip injury several months ago with normal, expected healing on exam. Reassured patient. Recommend liberal use of lip balm with sunscreen to protect skin as it heals.  - Patient with likely MSK chest pain, no  "evidence of ischemic chest pain and exercise tolerance >4 METS. Reassured patient and discussed sx that warrant recheck.  - Rash consistent with eczema on bilat toes, apply OTC eczema cream BID until resolved.  - Compliant with statin use, routine labs pending.  - Referred to derm for skin check as requested.    COUNSELING:   Reviewed preventive health counseling, as reflected in patient instructions       Immunizations    Declined: COVID due to Other (recent infection)               Colorectal cancer screening    Estimated body mass index is 27.84 kg/m  as calculated from the following:    Height as of this encounter: 1.854 m (6' 1\").    Weight as of this encounter: 95.7 kg (211 lb).     Weight management plan: Discussed healthy diet and exercise guidelines    He reports that he has never smoked. He has never used smokeless tobacco.      Counseling Resources:  ATP IV Guidelines  Pooled Cohorts Equation Calculator  FRAX Risk Assessment  ICSI Preventive Guidelines  Dietary Guidelines for Americans, 2010  USDA's MyPlate  ASA Prophylaxis  Lung CA Screening    Aby Fischer PA-C  [facility]  "

## 2022-10-25 LAB
ALBUMIN SERPL-MCNC: 4.3 G/DL (ref 3.4–5)
ALP SERPL-CCNC: 75 U/L (ref 40–150)
ALT SERPL W P-5'-P-CCNC: 73 U/L (ref 0–70)
ANION GAP SERPL CALCULATED.3IONS-SCNC: 5 MMOL/L (ref 3–14)
AST SERPL W P-5'-P-CCNC: 31 U/L (ref 0–45)
BILIRUB SERPL-MCNC: 0.8 MG/DL (ref 0.2–1.3)
BUN SERPL-MCNC: 13 MG/DL (ref 7–30)
CALCIUM SERPL-MCNC: 9 MG/DL (ref 8.5–10.1)
CHLORIDE BLD-SCNC: 106 MMOL/L (ref 94–109)
CHOLEST SERPL-MCNC: 168 MG/DL
CO2 SERPL-SCNC: 27 MMOL/L (ref 20–32)
CREAT SERPL-MCNC: 1.03 MG/DL (ref 0.66–1.25)
FASTING STATUS PATIENT QL REPORTED: YES
GFR SERPL CREATININE-BSD FRML MDRD: >90 ML/MIN/1.73M2
GLUCOSE BLD-MCNC: 90 MG/DL (ref 70–99)
HDLC SERPL-MCNC: 50 MG/DL
LDLC SERPL CALC-MCNC: 79 MG/DL
NONHDLC SERPL-MCNC: 118 MG/DL
POTASSIUM BLD-SCNC: 4.5 MMOL/L (ref 3.4–5.3)
PROT SERPL-MCNC: 7.9 G/DL (ref 6.8–8.8)
SODIUM SERPL-SCNC: 138 MMOL/L (ref 133–144)
TRIGL SERPL-MCNC: 196 MG/DL

## 2022-12-02 ENCOUNTER — TELEPHONE (OUTPATIENT)
Dept: GASTROENTEROLOGY | Facility: CLINIC | Age: 47
End: 2022-12-02

## 2022-12-02 NOTE — TELEPHONE ENCOUNTER
Screening Questions  BLUE  KIND OF PREP RED  LOCATION [review exclusion criteria] GREEN  SEDATION TYPE    Y Are you active on mychart?   Aby Fischer PA-C  Ordering/Referring Provider?    [facility]  What type of coverage do you have?  N Have you had a positive covid test in the last 90 days?     27.7  1. BMI  [BMI 40+ - review exclusion criteria]    SELF   2. Are you able to give consent for your medical care? [IF NO,RN REVIEW]        N  3. Are you taking any prescription pain medications on a routine schedule?        N 3a. EXTENDED PREP What kind of prescription?     N 4. Do you have any chemical dependencies such as alcohol, street drugs, or methadone?    N 5. Do you have any history of post-traumatic stress syndrome, severe anxiety or history of psychosis?      **If yes 3- 5 , please schedule with MAC sedation.**          IF YES TO ANY 6 - 10 - HOSPITAL SETTING ONLY.     N 6.   Do you need assistance transferring?     N 7.   Have you had a heart or lung transplant?    N 8.   Are you currently on dialysis?   N 9.   Do you use daily home oxygen?   N 10. Do you take nitroglycerin?   10a. N If yes, how often?     11. [FEMALES]   Are you currently pregnant?     11a.  If yes, how many weeks? [ Greater than 12 weeks, OR NEEDED]    N 12. [review exclusion criteria]  Do you have any implantable devices in your body (pacemaker, defib, LVAD)?    N 13. Do you have Pulmonary Hypertension?             *NEED PAC APPT AT [facility]*     N 14. In the past 6 months, have you had any heart related issues including cardiomyopathy or heart attack?     N 14a. If yes, did it require cardiac stenting if so when?     N 15. Have you had a stroke or Transient ischemic attack (TIA - aka  mini stroke ) within 6 months?      N 16. Do you have mod to severe Obstructive Sleep Apnea?  [Hospital only - Ok at [place]]    N 17. Do you have SEVERE AND UNCONTROLLED asthma?              *NEED PAC APPT AT [facility]*     N 18. Are you currently taking any  "blood thinners?     18a. If yes, inform patient to \"follow up w/ ordering provider for bridging instructions.\"    N 19. Do you take the medication Phentermine?    19a. If yes, \"Hold for 7 days before procedure.  Please consult your prescribing provider if you have questions about holding this medication.\"     N  20. Do you have chronic kidney disease?      N  21. Do you have a diagnosis of diabetes?     N  22. On a regular basis do you go 3-5 days between bowel movements?     23. Preferred LOCAL Pharmacy for Pre Prescription    [ LIST ONLY ONE PHARMACY]          [id number] - [place], [place] - [address] AT [facility] OF [facility] [address]        - CLOSING REMINDERS -    Informed patient they will need an adult    Cannot take any type of public or medical transportation alone    Conscious Sedation- Needs  for 6 hours after the procedure       MAC/General-Needs  for 24 hours after procedure    Pre-Procedure Covid test to be completed [[facility] PCR Testing Required]    Confirmed Nurse will call to complete assessment       - SCHEDULING DETAILS -    Additional comments:  SHELLY LUNDBERG   Surgeon   01/30/2023  Date of Procedure  MODERATE  Sedation Type     N PAC / Pre-op Required   Location  [facility]       Type of Procedure Scheduled  Lower Endoscopy [Colonoscopy]      Which Colonoscopy Prep was Sent?     STANDARD GOLYTELY-If you answer yes to questions #8, #20, #21          "

## 2023-03-02 ENCOUNTER — TELEPHONE (OUTPATIENT)
Dept: FAMILY MEDICINE | Facility: CLINIC | Age: 48
End: 2023-03-02

## 2023-03-02 NOTE — TELEPHONE ENCOUNTER
Patient Quality Outreach    Patient is due for the following:   Colon Cancer Screening    Next Steps:   Schedule colonoscopy     Type of outreach:    Sent WaveRx message.      Questions for provider review:    Pt is aware of scheduling   Morelia Leiva

## 2023-03-17 RX ORDER — BISACODYL 5 MG/1
TABLET, DELAYED RELEASE ORAL
Qty: 4 TABLET | Refills: 0 | Status: SHIPPED | OUTPATIENT
Start: 2023-03-17 | End: 2023-11-08

## 2023-03-23 NOTE — TELEPHONE ENCOUNTER
Former Dr Lowry patient, Dr Lowry still listed as PCP    LOV 6/03/2021 with Dr Richardson    No future OV scheduled      No indicators present

## 2023-03-27 ENCOUNTER — HOSPITAL ENCOUNTER (OUTPATIENT)
Facility: CLINIC | Age: 48
Discharge: HOME | End: 2023-03-27
Attending: COLON & RECTAL SURGERY | Admitting: COLON & RECTAL SURGERY
Payer: COMMERCIAL

## 2023-03-27 VITALS
SYSTOLIC BLOOD PRESSURE: 103 MMHG | DIASTOLIC BLOOD PRESSURE: 74 MMHG | HEART RATE: 59 BPM | RESPIRATION RATE: 13 BRPM | OXYGEN SATURATION: 94 %

## 2023-03-27 DIAGNOSIS — Z12.11 ENCOUNTER FOR SCREENING COLONOSCOPY: Primary | ICD-10-CM

## 2023-03-27 LAB — COLONOSCOPY: NORMAL

## 2023-03-27 PROCEDURE — 88305 TISSUE EXAM BY PATHOLOGIST: CPT | Mod: 26 | Performed by: PATHOLOGY

## 2023-03-27 PROCEDURE — 45378 DIAGNOSTIC COLONOSCOPY: CPT | Performed by: COLON & RECTAL SURGERY

## 2023-03-27 PROCEDURE — 45385 COLONOSCOPY W/LESION REMOVAL: CPT | Performed by: COLON & RECTAL SURGERY

## 2023-03-27 PROCEDURE — 250N000011 HC RX IP 250 OP 636: Performed by: COLON & RECTAL SURGERY

## 2023-03-27 PROCEDURE — G0500 MOD SEDAT ENDO SERVICE >5YRS: HCPCS | Performed by: COLON & RECTAL SURGERY

## 2023-03-27 PROCEDURE — 88305 TISSUE EXAM BY PATHOLOGIST: CPT | Mod: TC | Performed by: COLON & RECTAL SURGERY

## 2023-03-27 RX ORDER — FENTANYL CITRATE 50 UG/ML
INJECTION, SOLUTION INTRAMUSCULAR; INTRAVENOUS PRN
Status: DISCONTINUED | OUTPATIENT
Start: 2023-03-27 | End: 2023-03-27 | Stop reason: HOSPADM

## 2023-03-27 RX ORDER — LIDOCAINE 40 MG/G
CREAM TOPICAL
Status: DISCONTINUED | OUTPATIENT
Start: 2023-03-27 | End: 2023-03-27 | Stop reason: HOSPADM

## 2023-03-27 RX ORDER — ONDANSETRON 2 MG/ML
4 INJECTION INTRAMUSCULAR; INTRAVENOUS
Status: DISCONTINUED | OUTPATIENT
Start: 2023-03-27 | End: 2023-03-27 | Stop reason: HOSPADM

## 2023-03-27 ASSESSMENT — ACTIVITIES OF DAILY LIVING (ADL): ADLS_ACUITY_SCORE: 35

## 2023-03-27 NOTE — H&P
Colon & Rectal Surgery History and Physical  Pre-Endoscopy Procedure Note    History of Present Illness   I have been asked by Aby Fischer to evaluate this 47 year old male for colorectal cancer screening. He currently denies any abdominal pain, weight loss, bleeding per rectum, or recent change in bowel habits.    Past Medical History  Diagnosis Date     Contact dermatitis and other eczema, due to unspecified cause      Pure hypercholesterolemia        Past Surgical History  Procedure Laterality Date     Bone marrow harvest for donation          Medications  Medication Sig     atorvastatin (LIPITOR) 20 MG tablet Take 1 tablet (20 mg) by mouth daily     cetirizine (ZYRTEC) 10 MG tablet Take 10 mg by mouth daily     EPINEPHrine (EPIPEN 2-ALEXX) 0.3 MG/0.3ML injection 2-pack Inject 0.3 mLs (0.3 mg) into the muscle as needed for anaphylaxis     fluticasone (FLONASE) 50 MCG/ACT spray Spray 1 spray into both nostrils daily     hydrocortisone (ANUSOL-HC) 2.5 % cream Place rectally 2 times daily as needed (anal itching) Do not use for more than 7 consecutive days       Allergies  Allergen Reactions     Nuts Other (See Comments)     Airway inflammation/constriction     Cats      Dogs      Trees         Family History   Family history includes Alcohol/Drug use in his maternal grandfather and paternal grandfather; Allergies in his brother; Arthritis in his maternal grandmother; Coronary Artery Disease in an other family member; Diabetes in his father; Hypertension in his father and mother; Lipid Disorder in his father and mother; Other Cancer in his mother; Prostate Cancer in his paternal uncle.     Social History    He reports that he has never smoked. He has never used smokeless tobacco. He reports current alcohol use. He reports that he does not use drugs.    Review of Systems   Constitutional:  No fever, weight change or fatigue.    Eyes:     No dry eyes or vision changes.   Ears/Nose/Throat/Neck:  No oral ulcers, sore  throat or voice change.    Cardiovascular:   No palpitations, syncope, angina or edema.   Respiratory:    No chest pain, excessive sleepiness, shortness of breath or hemoptysis.    Gastrointestinal:   No abdominal pain, nausea, vomiting, diarrhea or heartburn.    Genitourinary:   No dysuria, hematuria, urinary retention or urinary frequency.   Musculoskeletal:  No joint swelling or arthralgias.    Dermatologic:  No skin rash or other skin changes.   Neurologic:    No focal weakness or numbness. No neuropathy.   Psychiatric:    No depression, anxiety, suicidal ideation, or paranoid ideation.   Endocrine:   No cold or heat intolerance, polydipsia, hirsutism, change in libido, or flushing.   Hematology/Lymphatic:  No bleeding or lymphadenopathy.    Allergy/Immunology:  No rhinitis or hives.     Physical Exam   Vitals:  /80, HR 60, RR 12, SpO2 98 %.    General:  Alert and oriented to person, place and time   Airway: Normal oropharyngeal airway and neck mobility   Lungs:  Clear bilaterally   Heart:  Regular rate and rhythm   Abdomen: Soft, NT, ND, no masses   Extremities: Warm, good capillary refill    ASA Grade: II (mild systemic disease)    Impression: Cleared for use of conscious sedation for colorectal cancer screening    Plan: Proceed with colonoscopy     Radha Zazueta MD  [facility]  [phone]

## 2023-03-28 LAB
PATH REPORT.COMMENTS IMP SPEC: NORMAL
PATH REPORT.COMMENTS IMP SPEC: NORMAL
PATH REPORT.FINAL DX SPEC: NORMAL
PATH REPORT.GROSS SPEC: NORMAL
PATH REPORT.MICROSCOPIC SPEC OTHER STN: NORMAL
PATH REPORT.RELEVANT HX SPEC: NORMAL
PHOTO IMAGE: NORMAL

## 2023-03-29 ENCOUNTER — E-VISIT (OUTPATIENT)
Dept: FAMILY MEDICINE | Facility: CLINIC | Age: 48
End: 2023-03-29
Payer: COMMERCIAL

## 2023-03-29 DIAGNOSIS — Z86.19 HISTORY OF TRAVELER'S DIARRHEA: Primary | ICD-10-CM

## 2023-03-29 PROCEDURE — 99422 OL DIG E/M SVC 11-20 MIN: CPT | Performed by: PHYSICIAN ASSISTANT

## 2023-03-31 RX ORDER — BISMUTH SUBSALICYLATE 262 MG/1
2 TABLET, CHEWABLE ORAL
Qty: 100 TABLET | Refills: 0 | Status: SHIPPED | OUTPATIENT
Start: 2023-03-31 | End: 2023-11-08

## 2023-03-31 RX ORDER — DIPHENOXYLATE HCL/ATROPINE 2.5-.025MG
1 TABLET ORAL 4 TIMES DAILY PRN
Qty: 20 TABLET | Refills: 0 | Status: SHIPPED | OUTPATIENT
Start: 2023-03-31 | End: 2023-11-08

## 2023-03-31 NOTE — PATIENT INSTRUCTIONS
Due to the emergence of multi-drug resistant strains of bacteria, antibiotics are not recommended for use during travel. Use of bismuth subsalicylate (the active ingredient in Pepto-Bismol) has been shown to decrease the incidence of traveler's diarrhea by 50%. Chew 2 tablets three times daily (prior to meals) and at bedtime; this medication has been sent to your pharmacy. Bismuth will stain your stools a very dark brown or black color; this is expected with this medication and not cause for concern.    Following safe food and beverage practices is the best way to prevent traveler's diarrhea. Please see below for recommendations from the CDC regarding safe food and drinks when traveling. Frequent handwashing is also recommended.    If you do contract diarrhea while traveling, it's important to stay well hydrated. I have also sent a prescription for Lomotil to your pharmacy. You may take this up to 4 times per day to help slow stools and help with abdominal cramping. Please see below for recommendations from the CDC to prevent spreading illness if you feel sick during travel.    Choose Safe Food and Drinks When Traveling  Contaminated food or drinks can cause travelers  diarrhea and other diseases and disrupt your travel. Travelers to some destinations are especially at risk. Find out which safe eating and drinking habits can reduce your chances of getting sick.    Safe vs. Risky Food  The following foods are usually safe to eat:    - Foods served hot: High heat kills most of the germs that cause travelers  diarrhea. If cooked food is served steaming hot, it is usually safe to eat. Be careful of food that is cooked and allowed to sit at room temperatures or in a warmer, such as on a buffet. It could become contaminated while sitting out.  - Dry or packaged foods: Most germs require moisture to grow, so foods that are dry, such as potato chips, are usually safe. Additionally, food in factory-sealed containers, such as  canned tuna or packaged crackers, are safe as long as it was not opened and handled by another person.    The following foods can be risky to eat:    - Raw foods: Avoid eating raw foods. Fruits or vegetables may be safer to eat if you can peel them yourself or wash them in bottled or disinfected water.  - Stay away from platters of cut-up fruit or vegetables. They may have been contaminated during preparation.    - Be careful with fresh salads. They are more likely to cause sickness because some germs on salad greens can t be washed off. Also, shredded or finely cut vegetables have a lot of surface area for germs to grow.  - Avoid fresh salsas and other sauces or condiments made from raw fruits or vegetables.  - Be careful with raw meat or seafood, including raw meat that is  cooked  with citrus juice, vinegar, or other acidic liquid (such as ceviche, a dish of raw seafood marinated in citrus juice). They may contain germs.  - Street food: Street vendors may not follow the same food preparation standards or follow the same safety practices that restaurants do, such as handwashing and using thermometers. Eat food from street vendors with caution. If you choose to eat street food, follow the same food safety rules as you would with other foods. For example, if you watch something come straight off the grill, cooked and steaming hot, it s more likely to be safe.  - Bushmeat: Bushmeat refers to local wild game, generally animals not typically eaten in the [place], such as bats, monkeys, or rodents. Bushmeat can be a source of animal-to-human spread of diseases, such as Ebola. Travelers should avoid eating bushmeat.    Safe vs. Risky Drinks    The following are usually safe to drink:    - Bottled or canned drinks: Drinks from unopened, factory-sealed bottles or cans are safer than tap water; however, vendors in some countries may sell tap water in bottles that are  sealed  with a drop of glue to mimic the factory  seal.  - Carbonated drinks, such as sodas or sparkling water, are typically safe since the bubbles indicate that the bottle was sealed at the factory. If you are drinking directly from a can, wipe off the lip of the can before it touches your mouth.  - Hot drinks: Hot coffee or tea should be safe if it is served steaming hot. It s okay to let it cool before you drink. Do not drink coffee or tea that is served warm or at room temperature.  - Be careful about adding things that may be contaminated to your hot drinks, such as cream or lemon. Sugar is usually fine because it is a dry food.  - Milk: Pasteurized milk from a sealed bottle is usually safe to drink. Do not drink milk stored in open containers, such as pitchers, that may have been sitting at room temperature; this includes cream for coffee or tea.  - Unpasteurized foods carry risks for all travelers; however, it is especially important for pregnant women or people who have a weakened immune system to avoid unpasteurized milk, cheese, and yogurt.  - Alcohol: The alcohol content of most liquors kills germs; when choosing mixers, stick to the guidelines about what types of food and drink are safer. Avoid drinks that have ice. The alcohol content of beer and wine is often not high enough to kill germs, but if it came from a sealed bottle or can, it should be okay.    The following drinks can be risky to drink:    - Tap water: Do not drink the tap water in most middle and low-income countries, even in cities. This includes swallowing water when showering or brushing your teeth. Brush your teeth with bottled or disinfected water. Tap water can be disinfected by boiling, filtering, or chemically treating it, such as with bleach or another chlorine product.  - [place] drinks: Sodas from a fountain, such as ones in restaurants, are made by carbonating water and mixing it with flavored syrup. Since the water most likely came from the restaurant s tap, do not drink  fountain drinks.  - Ice: Do not use ice in developing countries; it was likely made with tap water.  - Freshly squeezed juice: Avoid fruit juice, ice pops and other treats with freshly squeezed juice that were made by others. It is fine to drink fruit juice or eat ice pops and other treats if you washed the fruit in clean water and squeezed the juice yourself.    People with diarrhea should:  - Wash their hands thoroughly with soap and water for at least 20 seconds during key times, including after using the toilet  - NOT share their food with others  - NOT prepare food for others  - Stay home from childcare, school, and work while sick or until the health department says it is safe to return  - NOT swim or soak in shared bodies of water - including in pools, hot tubs, lakes, ponds, or the ocean - until they have fully recovered  - Wait to have sex until they have fully recovered  - Be extra diligent about practicing good hygiene for at least 2 weeks after diarrhea ends; people with some diarrheal illnesses might continue to shed the bacteria/viruses in stool after symptoms resolve

## 2023-09-12 ENCOUNTER — MYC MEDICAL ADVICE (OUTPATIENT)
Dept: FAMILY MEDICINE | Facility: CLINIC | Age: 48
End: 2023-09-12
Payer: COMMERCIAL

## 2023-09-12 DIAGNOSIS — E78.5 HYPERLIPIDEMIA, UNSPECIFIED HYPERLIPIDEMIA TYPE: ICD-10-CM

## 2023-09-13 RX ORDER — ATORVASTATIN CALCIUM 20 MG/1
20 TABLET, FILM COATED ORAL DAILY
Qty: 90 TABLET | Refills: 0 | Status: SHIPPED | OUTPATIENT
Start: 2023-09-13 | End: 2023-11-08

## 2023-09-13 NOTE — TELEPHONE ENCOUNTER
Patient requesting refill, notified patient to schedule annual physical with PCP.    Sondra Bautista RN

## 2023-11-07 ASSESSMENT — ENCOUNTER SYMPTOMS
PARESTHESIAS: 0
NAUSEA: 0
ABDOMINAL PAIN: 0
PALPITATIONS: 0
CHILLS: 0
DYSURIA: 0
DIARRHEA: 0
HEMATURIA: 0
CONSTIPATION: 0
SORE THROAT: 0
HEARTBURN: 0
FREQUENCY: 0
ARTHRALGIAS: 0
JOINT SWELLING: 0
WEAKNESS: 0
HEADACHES: 0
DIZZINESS: 0
MYALGIAS: 0
SHORTNESS OF BREATH: 0
COUGH: 0
HEMATOCHEZIA: 0
EYE PAIN: 0
NERVOUS/ANXIOUS: 0
FEVER: 0

## 2023-11-08 ENCOUNTER — OFFICE VISIT (OUTPATIENT)
Dept: FAMILY MEDICINE | Facility: CLINIC | Age: 48
End: 2023-11-08
Payer: COMMERCIAL

## 2023-11-08 VITALS
OXYGEN SATURATION: 97 % | HEART RATE: 55 BPM | WEIGHT: 206.6 LBS | HEIGHT: 73 IN | BODY MASS INDEX: 27.38 KG/M2 | DIASTOLIC BLOOD PRESSURE: 70 MMHG | SYSTOLIC BLOOD PRESSURE: 112 MMHG | TEMPERATURE: 97.7 F | RESPIRATION RATE: 18 BRPM

## 2023-11-08 DIAGNOSIS — Z80.8 FAMILY HISTORY OF SKIN CANCER: ICD-10-CM

## 2023-11-08 DIAGNOSIS — Z00.00 ROUTINE GENERAL MEDICAL EXAMINATION AT A HEALTH CARE FACILITY: Primary | ICD-10-CM

## 2023-11-08 DIAGNOSIS — Z23 NEED FOR VACCINATION: ICD-10-CM

## 2023-11-08 DIAGNOSIS — E78.2 MIXED HYPERLIPIDEMIA: ICD-10-CM

## 2023-11-08 LAB
ALBUMIN SERPL BCG-MCNC: 4.5 G/DL (ref 3.5–5.2)
ALP SERPL-CCNC: 75 U/L (ref 40–129)
ALT SERPL W P-5'-P-CCNC: 70 U/L (ref 0–70)
ANION GAP SERPL CALCULATED.3IONS-SCNC: 10 MMOL/L (ref 7–15)
AST SERPL W P-5'-P-CCNC: 48 U/L (ref 0–45)
BILIRUB SERPL-MCNC: 0.7 MG/DL
BUN SERPL-MCNC: 15.8 MG/DL (ref 6–20)
CALCIUM SERPL-MCNC: 9.4 MG/DL (ref 8.6–10)
CHLORIDE SERPL-SCNC: 105 MMOL/L (ref 98–107)
CHOLEST SERPL-MCNC: 181 MG/DL
CREAT SERPL-MCNC: 1.07 MG/DL (ref 0.67–1.17)
DEPRECATED HCO3 PLAS-SCNC: 24 MMOL/L (ref 22–29)
EGFRCR SERPLBLD CKD-EPI 2021: 86 ML/MIN/1.73M2
GLUCOSE SERPL-MCNC: 98 MG/DL (ref 70–99)
HDLC SERPL-MCNC: 35 MG/DL
LDLC SERPL CALC-MCNC: 108 MG/DL
NONHDLC SERPL-MCNC: 146 MG/DL
POTASSIUM SERPL-SCNC: 4.8 MMOL/L (ref 3.4–5.3)
PROT SERPL-MCNC: 7.6 G/DL (ref 6.4–8.3)
SODIUM SERPL-SCNC: 139 MMOL/L (ref 135–145)
TRIGL SERPL-MCNC: 192 MG/DL

## 2023-11-08 PROCEDURE — 36415 COLL VENOUS BLD VENIPUNCTURE: CPT | Performed by: PHYSICIAN ASSISTANT

## 2023-11-08 PROCEDURE — 90715 TDAP VACCINE 7 YRS/> IM: CPT | Performed by: PHYSICIAN ASSISTANT

## 2023-11-08 PROCEDURE — 99396 PREV VISIT EST AGE 40-64: CPT | Mod: 25 | Performed by: PHYSICIAN ASSISTANT

## 2023-11-08 PROCEDURE — 80053 COMPREHEN METABOLIC PANEL: CPT | Performed by: PHYSICIAN ASSISTANT

## 2023-11-08 PROCEDURE — 80061 LIPID PANEL: CPT | Performed by: PHYSICIAN ASSISTANT

## 2023-11-08 PROCEDURE — 90471 IMMUNIZATION ADMIN: CPT | Performed by: PHYSICIAN ASSISTANT

## 2023-11-08 RX ORDER — ATORVASTATIN CALCIUM 20 MG/1
20 TABLET, FILM COATED ORAL DAILY
Qty: 90 TABLET | Refills: 3 | Status: SHIPPED | OUTPATIENT
Start: 2023-11-08

## 2023-11-08 ASSESSMENT — ENCOUNTER SYMPTOMS
HEMATOCHEZIA: 0
ABDOMINAL PAIN: 0
COUGH: 0
PALPITATIONS: 0
NERVOUS/ANXIOUS: 0
EYE PAIN: 0
DIZZINESS: 0
ARTHRALGIAS: 0
FREQUENCY: 0
HEADACHES: 0
PARESTHESIAS: 0
CHILLS: 0
WEAKNESS: 0
HEMATURIA: 0
HEARTBURN: 0
DYSURIA: 0
MYALGIAS: 0
SHORTNESS OF BREATH: 0
JOINT SWELLING: 0
FEVER: 0
CONSTIPATION: 0
NAUSEA: 0
DIARRHEA: 0
SORE THROAT: 0

## 2023-11-08 ASSESSMENT — PAIN SCALES - GENERAL: PAINLEVEL: NO PAIN (0)

## 2023-11-08 NOTE — PROGRESS NOTES
SUBJECTIVE:   CC: Florentin is an 48 year old who presents for preventative health visit.       11/8/2023     8:36 AM   Additional Questions   Roomed by ciro       Healthy Habits:     Getting at least 3 servings of Calcium per day:  Yes    Bi-annual eye exam:  Yes    Dental care twice a year:  Yes    Sleep apnea or symptoms of sleep apnea:  Excessive snoring    Diet:  Regular (no restrictions)    Frequency of exercise:  1 day/week    Duration of exercise:  Less than 15 minutes    Taking medications regularly:  Yes    Medication side effects:  Not applicable    Additional concerns today:  No      Today's PHQ-2 Score:       11/7/2023    10:15 AM   PHQ-2 ( 1999 [employer])   Q1: Little interest or pleasure in doing things 0   Q2: Feeling down, depressed or hopeless 0   PHQ-2 Score 0   Q1: Little interest or pleasure in doing things Not at all   Q2: Feeling down, depressed or hopeless Not at all   PHQ-2 Score 0       Hyperlipidemia Follow-Up    Are you regularly taking any medication or supplement to lower your cholesterol?   Yes- statin  Are you having muscle aches or other side effects that you think could be caused by your cholesterol lowering medication?  No      Social History     Tobacco Use    Smoking status: Never    Smokeless tobacco: Never    Tobacco comments:     socially   Substance Use Topics    Alcohol use: Yes     Comment: 6-8 drinks on wkends             11/7/2023    10:15 AM   Alcohol Use   Prescreen: >3 drinks/day or >7 drinks/week? No         10/24/2022     1:43 PM   AUDIT - Alcohol Use Disorders Identification Test - Reproduced from the World Health Organization Audit 2001 (Second Edition)   1.  How often do you have a drink containing alcohol? 2 to 3 times a week   2.  How many drinks containing alcohol do you have on a typical day when you are drinking? 3 or 4   3.  How often do you have five or more drinks on one occasion? Less than monthly   4.  How often during the last year have you found that you  were not able to stop drinking once you had started? Never   5.  How often during the last year have you failed to do what was normally expected of you because of drinking? Less than monthly   6.  How often during the last year have you needed a first drink in the morning to get yourself going after a heavy drinking session? Never   7.  How often during the last year have you had a feeling of guilt or remorse after drinking? Never   8.  How often during the last year have you been unable to remember what happened the night before because of your drinking? Never   9.  Have you or someone else been injured because of your drinking? No   10. Has a relative, friend, doctor or other health care worker been concerned about your drinking or suggested you cut down? No   TOTAL SCORE 6       Last PSA:   PSA   Date Value Ref Range Status   06/03/2021 0.66 0 - 4 ug/L Final     Comment:     Assay Method:  Chemiluminescence using Siemens Vista analyzer       Reviewed orders with patient. Reviewed health maintenance and updated orders accordingly - Yes      Reviewed and updated as needed this visit by clinical staff   Tobacco  Allergies  Meds  Problems  Med Hx  Surg Hx  Fam Hx          Reviewed and updated as needed this visit by Provider   Tobacco  Allergies  Meds  Problems  Med Hx  Surg Hx  Fam Hx             Review of Systems   Constitutional:  Negative for chills and fever.   HENT:  Negative for congestion, ear pain, hearing loss and sore throat.    Eyes:  Negative for pain and visual disturbance.   Respiratory:  Negative for cough and shortness of breath.    Cardiovascular:  Negative for chest pain, palpitations and peripheral edema.   Gastrointestinal:  Negative for abdominal pain, constipation, diarrhea, heartburn, hematochezia and nausea.   Genitourinary:  Negative for dysuria, frequency, genital sores, hematuria, impotence, penile discharge and urgency.   Musculoskeletal:  Negative for arthralgias, joint  "swelling and myalgias.   Skin:  Negative for rash.   Neurological:  Negative for dizziness, weakness, headaches and paresthesias.   Psychiatric/Behavioral:  Negative for mood changes. The patient is not nervous/anxious.          OBJECTIVE:   /70 (BP Location: Right arm, Patient Position: Sitting, Cuff Size: Adult Large)   Pulse 55   Temp 97.7  F (36.5  C) (Tympanic)   Resp 18   Ht 1.854 m (6' 1\")   Wt 93.7 kg (206 lb 9.6 oz)   SpO2 97%   BMI 27.26 kg/m      Physical Exam  GENERAL: healthy, alert and no distress  EYES: Eyes grossly normal to inspection, PERRL and conjunctivae and sclerae normal  HENT: ear canals and TM's normal, nose and mouth without ulcers or lesions  NECK: no adenopathy, no asymmetry, masses, or scars and thyroid normal to palpation  RESP: lungs clear to auscultation - no rales, rhonchi or wheezes  CV: regular rate and rhythm, normal S1 S2, no S3 or S4, no murmur, click or rub, no peripheral edema and peripheral pulses strong  ABDOMEN: soft, nontender, no hepatosplenomegaly, no masses and bowel sounds normal  MS: no gross musculoskeletal defects noted, no edema  SKIN: no suspicious lesions or rashes  NEURO: Normal strength and tone, mentation intact and speech normal  PSYCH: mentation appears normal, affect normal/bright      ASSESSMENT/PLAN:       ICD-10-CM    1. Routine general medical examination at a health care facility  Z00.00       2. Mixed hyperlipidemia  E78.2 Lipid panel reflex to direct LDL Non-fasting     Comprehensive metabolic panel     atorvastatin (LIPITOR) 20 MG tablet     Lipid panel reflex to direct LDL Non-fasting     Comprehensive metabolic panel      3. Family history of skin cancer  Z80.8 Adult Dermatology  Referral      4. Need for vaccination  Z23 TDAP 7+ (ADACEL,BOOSTRIX)        HLP well managed on current regimen, continue without change. Routine labs pending.      COUNSELING:   Reviewed preventive health counseling, as reflected in patient " instructions       Immunizations  Vaccinated for: TDAP          He reports that he has never smoked. He has never used smokeless tobacco.            GIOVANNI Roach [facility]

## 2024-11-23 ENCOUNTER — MYC REFILL (OUTPATIENT)
Dept: FAMILY MEDICINE | Facility: CLINIC | Age: 49
End: 2024-11-23
Payer: COMMERCIAL

## 2024-11-23 DIAGNOSIS — E78.2 MIXED HYPERLIPIDEMIA: ICD-10-CM

## 2024-11-23 DIAGNOSIS — Z91.018 NUT ALLERGY: ICD-10-CM

## 2024-11-25 RX ORDER — EPINEPHRINE 0.3 MG/.3ML
0.3 INJECTION SUBCUTANEOUS PRN
Qty: 0.6 ML | Refills: 0 | Status: SHIPPED | OUTPATIENT
Start: 2024-11-25

## 2024-11-25 RX ORDER — ATORVASTATIN CALCIUM 20 MG/1
20 TABLET, FILM COATED ORAL DAILY
Qty: 90 TABLET | Refills: 0 | Status: SHIPPED | OUTPATIENT
Start: 2024-11-25

## 2024-12-22 ENCOUNTER — HEALTH MAINTENANCE LETTER (OUTPATIENT)
Age: 49
End: 2024-12-22

## 2025-01-08 ENCOUNTER — OFFICE VISIT (OUTPATIENT)
Dept: FAMILY MEDICINE | Facility: CLINIC | Age: 50
End: 2025-01-08
Payer: COMMERCIAL

## 2025-01-08 VITALS
RESPIRATION RATE: 8 BRPM | SYSTOLIC BLOOD PRESSURE: 124 MMHG | HEART RATE: 58 BPM | OXYGEN SATURATION: 99 % | DIASTOLIC BLOOD PRESSURE: 86 MMHG | HEIGHT: 73 IN | WEIGHT: 208.2 LBS | BODY MASS INDEX: 27.59 KG/M2 | TEMPERATURE: 97.1 F

## 2025-01-08 DIAGNOSIS — S86.819A STRAIN OF CALF MUSCLE, UNSPECIFIED LATERALITY, INITIAL ENCOUNTER: ICD-10-CM

## 2025-01-08 DIAGNOSIS — R03.0 ELEVATED BP WITHOUT DIAGNOSIS OF HYPERTENSION: Primary | ICD-10-CM

## 2025-01-08 DIAGNOSIS — E78.2 MIXED HYPERLIPIDEMIA: ICD-10-CM

## 2025-01-08 LAB
ALBUMIN SERPL BCG-MCNC: 4.6 G/DL (ref 3.5–5.2)
ALP SERPL-CCNC: 83 U/L (ref 40–150)
ALT SERPL W P-5'-P-CCNC: 92 U/L (ref 0–70)
ANION GAP SERPL CALCULATED.3IONS-SCNC: 10 MMOL/L (ref 7–15)
AST SERPL W P-5'-P-CCNC: 46 U/L (ref 0–45)
BILIRUB SERPL-MCNC: 0.8 MG/DL
BUN SERPL-MCNC: 11.2 MG/DL (ref 6–20)
CALCIUM SERPL-MCNC: 9.5 MG/DL (ref 8.8–10.4)
CHLORIDE SERPL-SCNC: 103 MMOL/L (ref 98–107)
CHOLEST SERPL-MCNC: 185 MG/DL
CREAT SERPL-MCNC: 1 MG/DL (ref 0.67–1.17)
EGFRCR SERPLBLD CKD-EPI 2021: >90 ML/MIN/1.73M2
ERYTHROCYTE [DISTWIDTH] IN BLOOD BY AUTOMATED COUNT: 12.6 % (ref 10–15)
ERYTHROCYTE [SEDIMENTATION RATE] IN BLOOD BY WESTERGREN METHOD: 6 MM/HR (ref 0–15)
FASTING STATUS PATIENT QL REPORTED: ABNORMAL
FASTING STATUS PATIENT QL REPORTED: ABNORMAL
GLUCOSE SERPL-MCNC: 101 MG/DL (ref 70–99)
HCO3 SERPL-SCNC: 27 MMOL/L (ref 22–29)
HCT VFR BLD AUTO: 45.1 % (ref 40–53)
HDLC SERPL-MCNC: 36 MG/DL
HGB BLD-MCNC: 15.6 G/DL (ref 13.3–17.7)
LDLC SERPL CALC-MCNC: 106 MG/DL
MCH RBC QN AUTO: 31.3 PG (ref 26.5–33)
MCHC RBC AUTO-ENTMCNC: 34.6 G/DL (ref 31.5–36.5)
MCV RBC AUTO: 90 FL (ref 78–100)
NONHDLC SERPL-MCNC: 149 MG/DL
PLATELET # BLD AUTO: 298 10E3/UL (ref 150–450)
POTASSIUM SERPL-SCNC: 4.4 MMOL/L (ref 3.4–5.3)
PROT SERPL-MCNC: 7.7 G/DL (ref 6.4–8.3)
RBC # BLD AUTO: 4.99 10E6/UL (ref 4.4–5.9)
SODIUM SERPL-SCNC: 140 MMOL/L (ref 135–145)
TRIGL SERPL-MCNC: 217 MG/DL
WBC # BLD AUTO: 4.8 10E3/UL (ref 4–11)

## 2025-01-08 PROCEDURE — 36415 COLL VENOUS BLD VENIPUNCTURE: CPT | Performed by: PHYSICIAN ASSISTANT

## 2025-01-08 PROCEDURE — 80061 LIPID PANEL: CPT | Performed by: PHYSICIAN ASSISTANT

## 2025-01-08 PROCEDURE — 80053 COMPREHEN METABOLIC PANEL: CPT | Performed by: PHYSICIAN ASSISTANT

## 2025-01-08 PROCEDURE — 85027 COMPLETE CBC AUTOMATED: CPT | Performed by: PHYSICIAN ASSISTANT

## 2025-01-08 PROCEDURE — 90471 IMMUNIZATION ADMIN: CPT | Performed by: PHYSICIAN ASSISTANT

## 2025-01-08 PROCEDURE — 90656 IIV3 VACC NO PRSV 0.5 ML IM: CPT | Performed by: PHYSICIAN ASSISTANT

## 2025-01-08 PROCEDURE — 85652 RBC SED RATE AUTOMATED: CPT | Performed by: PHYSICIAN ASSISTANT

## 2025-01-08 PROCEDURE — 99214 OFFICE O/P EST MOD 30 MIN: CPT | Mod: 25 | Performed by: PHYSICIAN ASSISTANT

## 2025-01-08 RX ORDER — CLOBETASOL PROPIONATE 0.5 MG/G
OINTMENT TOPICAL
COMMUNITY
Start: 2024-06-07

## 2025-01-08 ASSESSMENT — PAIN SCALES - GENERAL: PAINLEVEL_OUTOF10: NO PAIN (0)

## 2025-01-08 NOTE — PROGRESS NOTES
Assessment & Plan       ICD-10-CM    1. Elevated BP without diagnosis of hypertension  R03.0       2. Strain of calf muscle, unspecified laterality, initial encounter  S86.819A CBC with platelets     ESR: Erythrocyte sedimentation rate     CBC with platelets     ESR: Erythrocyte sedimentation rate      3. Mixed hyperlipidemia  E78.2 Lipid panel reflex to direct LDL Non-fasting     Comprehensive metabolic panel     Lipid panel reflex to direct LDL Non-fasting     Comprehensive metabolic panel        BP excellent today -- suspect machine error outside of clinic. No further intervention needed at this time.    Suspect MSK strain causing calf tightness. No evidence of DVT or other abnormality on exam. Will check CBC, ESR for completeness.    Due for surveillance labs for HLP, pending. Continue statin daily.    The longitudinal plan of care for the diagnosis(es)/condition(s) as documented were addressed during this visit. Due to the added complexity in care, I will continue to support Florentin in the subsequent management and with ongoing continuity of care.    To Lerma is a 49 year old, presenting for the following health issues:  Hypertension (Patient is concerned about recent BP reading of 156/97. Patient is not on any BP medications. Also experiencing tightness bilateral calves. )        1/8/2025    11:19 AM   Additional Questions   Roomed by Awais GODINEZ     History of Present Illness       Reason for visit:  Chexk cholesterol and blood pressure  have had tightness  in calves He is missing 1 dose(s) of medications per week.  He is not taking prescribed medications regularly due to other.       Hyperlipidemia Follow-Up    Are you regularly taking any medication or supplement to lower your cholesterol?   No  Are you having muscle aches or other side effects that you think could be caused by your cholesterol lowering medication?  No    Notes some ongoing calf tightness over the past few weeks. Checked BP at  "automatic machine at [facility] and was elevated (150s/90s). No pain in calves, just tight. Has been skating and doing other physical activities which may be contributing.    Due for cholesterol check and routine labs, on daily statin.      Review of Systems  Constitutional, HEENT, cardiovascular, pulmonary, GI, , musculoskeletal, neuro, skin, endocrine and psych systems are negative, except as otherwise noted.      Objective    /86   Pulse 58   Temp 97.1  F (36.2  C) (Tympanic)   Resp (!) 8   Ht 1.845 m (6' 0.64\")   Wt 94.4 kg (208 lb 3.2 oz)   SpO2 99%   BMI 27.74 kg/m    Body mass index is 27.74 kg/m .  Physical Exam   GENERAL:  WDWN, no acute distress  PSYCH: pleasant, cooperative  EYES: no discharge, no injection  HENT:  Normocephalic. Moist mucus membranes.  NECK:  Supple, symmetric  EXTREMITIES:  No gross deformities, moves all 4 limbs spontaneously, no peripheral edema. No palpable mass or cord in calf muscles bilat. No warmth or rash of BLE.  SKIN:  Warm and dry, no rash or suspicious lesions    NEUROLOGIC:  Alert, sensation grossly intact.            Signed Electronically by: Aby Fischer PA-C    "

## 2025-01-09 DIAGNOSIS — E78.2 MIXED HYPERLIPIDEMIA: ICD-10-CM

## 2025-01-09 DIAGNOSIS — R73.01 IFG (IMPAIRED FASTING GLUCOSE): Primary | ICD-10-CM

## 2025-01-09 RX ORDER — ROSUVASTATIN CALCIUM 20 MG/1
20 TABLET, COATED ORAL DAILY
Qty: 90 TABLET | Refills: 0 | Status: SHIPPED | OUTPATIENT
Start: 2025-01-09

## 2025-02-26 ENCOUNTER — ANCILLARY PROCEDURE (OUTPATIENT)
Dept: ULTRASOUND IMAGING | Facility: CLINIC | Age: 50
End: 2025-02-26
Attending: PHYSICIAN ASSISTANT
Payer: COMMERCIAL

## 2025-02-26 DIAGNOSIS — R79.89 ELEVATED LFTS: ICD-10-CM

## 2025-02-26 PROCEDURE — 76705 ECHO EXAM OF ABDOMEN: CPT

## 2025-03-11 ENCOUNTER — LAB (OUTPATIENT)
Dept: LAB | Facility: CLINIC | Age: 50
End: 2025-03-11
Payer: COMMERCIAL

## 2025-03-11 DIAGNOSIS — K76.0 METABOLIC DYSFUNCTION-ASSOCIATED STEATOTIC LIVER DISEASE (MASLD): ICD-10-CM

## 2025-03-11 LAB
ALBUMIN SERPL BCG-MCNC: 4.5 G/DL (ref 3.5–5.2)
ALP SERPL-CCNC: 72 U/L (ref 40–150)
ALT SERPL W P-5'-P-CCNC: 71 U/L (ref 0–70)
AST SERPL W P-5'-P-CCNC: 41 U/L (ref 0–45)
BILIRUB DIRECT SERPL-MCNC: 0.34 MG/DL (ref 0–0.3)
BILIRUB SERPL-MCNC: 0.9 MG/DL
PROT SERPL-MCNC: 7.3 G/DL (ref 6.4–8.3)

## 2025-03-11 PROCEDURE — 36415 COLL VENOUS BLD VENIPUNCTURE: CPT

## 2025-03-11 PROCEDURE — 80076 HEPATIC FUNCTION PANEL: CPT

## 2025-06-04 ENCOUNTER — LAB (OUTPATIENT)
Dept: LAB | Facility: CLINIC | Age: 50
End: 2025-06-04
Payer: COMMERCIAL

## 2025-06-04 DIAGNOSIS — E78.2 MIXED HYPERLIPIDEMIA: ICD-10-CM

## 2025-06-04 DIAGNOSIS — K76.0 METABOLIC DYSFUNCTION-ASSOCIATED STEATOTIC LIVER DISEASE (MASLD): ICD-10-CM

## 2025-06-04 LAB
ALBUMIN SERPL BCG-MCNC: 4.5 G/DL (ref 3.5–5.2)
ALP SERPL-CCNC: 63 U/L (ref 40–150)
ALT SERPL W P-5'-P-CCNC: 47 U/L (ref 0–70)
AST SERPL W P-5'-P-CCNC: 34 U/L (ref 0–45)
BILIRUB SERPL-MCNC: 0.8 MG/DL
BILIRUBIN DIRECT (ROCHE PRO & PURE): 0.28 MG/DL (ref 0–0.45)
CHOLEST SERPL-MCNC: 188 MG/DL
FASTING STATUS PATIENT QL REPORTED: YES
HDLC SERPL-MCNC: 36 MG/DL
LDLC SERPL CALC-MCNC: 111 MG/DL
NONHDLC SERPL-MCNC: 152 MG/DL
PROT SERPL-MCNC: 7.3 G/DL (ref 6.4–8.3)
TRIGL SERPL-MCNC: 203 MG/DL

## 2025-06-04 PROCEDURE — 36415 COLL VENOUS BLD VENIPUNCTURE: CPT

## 2025-06-04 PROCEDURE — 80076 HEPATIC FUNCTION PANEL: CPT

## 2025-06-04 PROCEDURE — 80061 LIPID PANEL: CPT

## 2025-06-05 ENCOUNTER — RESULTS FOLLOW-UP (OUTPATIENT)
Dept: FAMILY MEDICINE | Facility: CLINIC | Age: 50
End: 2025-06-05

## 2025-06-25 DIAGNOSIS — E78.2 MIXED HYPERLIPIDEMIA: ICD-10-CM

## 2025-06-25 RX ORDER — ROSUVASTATIN CALCIUM 10 MG/1
10 TABLET, COATED ORAL DAILY
Qty: 90 TABLET | Refills: 1 | Status: SHIPPED | OUTPATIENT
Start: 2025-06-25

## (undated) RX ORDER — FENTANYL CITRATE 50 UG/ML
INJECTION, SOLUTION INTRAMUSCULAR; INTRAVENOUS
Status: DISPENSED
Start: 2023-03-27